# Patient Record
Sex: FEMALE | Race: WHITE | NOT HISPANIC OR LATINO | URBAN - METROPOLITAN AREA
[De-identification: names, ages, dates, MRNs, and addresses within clinical notes are randomized per-mention and may not be internally consistent; named-entity substitution may affect disease eponyms.]

---

## 2017-09-14 ENCOUNTER — OUTPATIENT (OUTPATIENT)
Dept: OUTPATIENT SERVICES | Facility: HOSPITAL | Age: 77
LOS: 1 days | Discharge: HOME | End: 2017-09-14

## 2017-09-14 DIAGNOSIS — E03.9 HYPOTHYROIDISM, UNSPECIFIED: ICD-10-CM

## 2017-09-14 DIAGNOSIS — E87.8 OTHER DISORDERS OF ELECTROLYTE AND FLUID BALANCE, NOT ELSEWHERE CLASSIFIED: ICD-10-CM

## 2017-09-14 DIAGNOSIS — I10 ESSENTIAL (PRIMARY) HYPERTENSION: ICD-10-CM

## 2017-09-14 DIAGNOSIS — E11.9 TYPE 2 DIABETES MELLITUS WITHOUT COMPLICATIONS: ICD-10-CM

## 2017-09-14 DIAGNOSIS — E78.5 HYPERLIPIDEMIA, UNSPECIFIED: ICD-10-CM

## 2017-09-14 DIAGNOSIS — K75.9 INFLAMMATORY LIVER DISEASE, UNSPECIFIED: ICD-10-CM

## 2017-09-14 DIAGNOSIS — D50.9 IRON DEFICIENCY ANEMIA, UNSPECIFIED: ICD-10-CM

## 2017-09-14 DIAGNOSIS — N39.0 URINARY TRACT INFECTION, SITE NOT SPECIFIED: ICD-10-CM

## 2017-09-14 DIAGNOSIS — E16.2 HYPOGLYCEMIA, UNSPECIFIED: ICD-10-CM

## 2017-09-14 DIAGNOSIS — E78.2 MIXED HYPERLIPIDEMIA: ICD-10-CM

## 2018-02-03 ENCOUNTER — INPATIENT (INPATIENT)
Facility: HOSPITAL | Age: 78
LOS: 6 days | Discharge: HOME HEALTH | End: 2018-02-10
Attending: INTERNAL MEDICINE

## 2018-02-03 VITALS
OXYGEN SATURATION: 98 % | RESPIRATION RATE: 18 BRPM | HEART RATE: 60 BPM | SYSTOLIC BLOOD PRESSURE: 129 MMHG | TEMPERATURE: 96 F | DIASTOLIC BLOOD PRESSURE: 65 MMHG

## 2018-02-03 DIAGNOSIS — E11.9 TYPE 2 DIABETES MELLITUS WITHOUT COMPLICATIONS: ICD-10-CM

## 2018-02-03 DIAGNOSIS — D50.0 IRON DEFICIENCY ANEMIA SECONDARY TO BLOOD LOSS (CHRONIC): ICD-10-CM

## 2018-02-03 DIAGNOSIS — E78.00 PURE HYPERCHOLESTEROLEMIA, UNSPECIFIED: ICD-10-CM

## 2018-02-03 DIAGNOSIS — R55 SYNCOPE AND COLLAPSE: ICD-10-CM

## 2018-02-03 DIAGNOSIS — I10 ESSENTIAL (PRIMARY) HYPERTENSION: ICD-10-CM

## 2018-02-03 DIAGNOSIS — Z98.890 OTHER SPECIFIED POSTPROCEDURAL STATES: Chronic | ICD-10-CM

## 2018-02-03 LAB
ALBUMIN SERPL ELPH-MCNC: 3.6 G/DL — SIGNIFICANT CHANGE UP (ref 3–5.5)
ALP SERPL-CCNC: 62 U/L — SIGNIFICANT CHANGE UP (ref 30–115)
ALT FLD-CCNC: 15 U/L — SIGNIFICANT CHANGE UP (ref 0–41)
ANION GAP SERPL CALC-SCNC: 11 MMOL/L — SIGNIFICANT CHANGE UP (ref 7–14)
APTT BLD: >120 SEC — SIGNIFICANT CHANGE UP (ref 27–39.2)
AST SERPL-CCNC: 21 U/L — SIGNIFICANT CHANGE UP (ref 0–41)
B-TYPE NATRIURETIC PEPTIDE BNP RESULT: 782 PG/ML — HIGH (ref 0–99)
BASOPHILS # BLD AUTO: 0.01 K/UL — SIGNIFICANT CHANGE UP (ref 0–0.2)
BASOPHILS NFR BLD AUTO: 0.1 % — SIGNIFICANT CHANGE UP (ref 0–1)
BILIRUB SERPL-MCNC: 0.8 MG/DL — SIGNIFICANT CHANGE UP (ref 0.2–1.2)
BUN SERPL-MCNC: 73 MG/DL — CRITICAL HIGH (ref 10–20)
CALCIUM SERPL-MCNC: 8.7 MG/DL — SIGNIFICANT CHANGE UP (ref 8.5–10.1)
CHLORIDE SERPL-SCNC: 104 MMOL/L — SIGNIFICANT CHANGE UP (ref 98–110)
CK MB BLD-MCNC: 4 % — SIGNIFICANT CHANGE UP (ref 0–4)
CK MB CFR SERPL CALC: 5.6 NG/ML — SIGNIFICANT CHANGE UP (ref 0.6–6.3)
CK SERPL-CCNC: 148 U/L — SIGNIFICANT CHANGE UP (ref 0–225)
CO2 SERPL-SCNC: 24 MMOL/L — SIGNIFICANT CHANGE UP (ref 17–32)
CREAT SERPL-MCNC: 1.7 MG/DL — HIGH (ref 0.7–1.5)
EOSINOPHIL # BLD AUTO: 0.11 K/UL — SIGNIFICANT CHANGE UP (ref 0–0.7)
EOSINOPHIL NFR BLD AUTO: 1.1 % — SIGNIFICANT CHANGE UP (ref 0–8)
GLUCOSE SERPL-MCNC: 114 MG/DL — HIGH (ref 70–110)
HCT VFR BLD CALC: 24.7 % — LOW (ref 37–47)
HGB BLD-MCNC: 8.1 G/DL — LOW (ref 14–18)
IMM GRANULOCYTES NFR BLD AUTO: 0.7 % — HIGH (ref 0.1–0.3)
INR BLD: 1.04 RATIO — SIGNIFICANT CHANGE UP (ref 0.65–1.3)
LACTATE SERPL-SCNC: 0.9 MMOL/L — SIGNIFICANT CHANGE UP (ref 0.5–2.2)
LIDOCAIN IGE QN: 29 U/L — SIGNIFICANT CHANGE UP (ref 7–60)
LYMPHOCYTES # BLD AUTO: 0.89 K/UL — LOW (ref 1.2–3.4)
LYMPHOCYTES # BLD AUTO: 8.5 % — LOW (ref 20.5–51.1)
MCHC RBC-ENTMCNC: 29.1 PG — SIGNIFICANT CHANGE UP (ref 27–31)
MCHC RBC-ENTMCNC: 32.8 G/DL — SIGNIFICANT CHANGE UP (ref 32–37)
MCV RBC AUTO: 88.8 FL — SIGNIFICANT CHANGE UP (ref 81–91)
MONOCYTES # BLD AUTO: 0.65 K/UL — HIGH (ref 0.1–0.6)
MONOCYTES NFR BLD AUTO: 6.2 % — SIGNIFICANT CHANGE UP (ref 1.7–9.3)
NEUTROPHILS # BLD AUTO: 8.72 K/UL — HIGH (ref 1.4–6.5)
NEUTROPHILS NFR BLD AUTO: 83.4 % — HIGH (ref 42.2–75.2)
PLATELET # BLD AUTO: 363 K/UL — SIGNIFICANT CHANGE UP (ref 130–400)
POTASSIUM SERPL-MCNC: 4.5 MMOL/L — SIGNIFICANT CHANGE UP (ref 3.5–5)
POTASSIUM SERPL-SCNC: 4.5 MMOL/L — SIGNIFICANT CHANGE UP (ref 3.5–5)
PROT SERPL-MCNC: 6.5 G/DL — SIGNIFICANT CHANGE UP (ref 6–8)
PROTHROM AB SERPL-ACNC: 11.2 SEC — SIGNIFICANT CHANGE UP (ref 9.95–12.87)
RBC # BLD: 2.78 M/UL — LOW (ref 4.2–5.4)
RBC # FLD: 14.6 % — HIGH (ref 11.5–14.5)
SODIUM SERPL-SCNC: 139 MMOL/L — SIGNIFICANT CHANGE UP (ref 135–146)
TROPONIN I SERPL-MCNC: 0.02 NG/ML — SIGNIFICANT CHANGE UP (ref 0–0.05)
WBC # BLD: 10.45 K/UL — SIGNIFICANT CHANGE UP (ref 4.8–10.8)
WBC # FLD AUTO: 10.45 K/UL — SIGNIFICANT CHANGE UP (ref 4.8–10.8)

## 2018-02-03 RX ORDER — SODIUM CHLORIDE 9 MG/ML
1000 INJECTION INTRAMUSCULAR; INTRAVENOUS; SUBCUTANEOUS ONCE
Qty: 0 | Refills: 0 | Status: COMPLETED | OUTPATIENT
Start: 2018-02-03 | End: 2018-02-03

## 2018-02-03 RX ORDER — INSULIN DETEMIR 100/ML (3)
25 INSULIN PEN (ML) SUBCUTANEOUS AT BEDTIME
Qty: 0 | Refills: 0 | Status: DISCONTINUED | OUTPATIENT
Start: 2018-02-03 | End: 2018-02-06

## 2018-02-03 RX ORDER — METFORMIN HYDROCHLORIDE 850 MG/1
500 TABLET ORAL
Qty: 0 | Refills: 0 | Status: DISCONTINUED | OUTPATIENT
Start: 2018-02-03 | End: 2018-02-06

## 2018-02-03 RX ORDER — TETANUS TOXOID, REDUCED DIPHTHERIA TOXOID AND ACELLULAR PERTUSSIS VACCINE, ADSORBED 5; 2.5; 8; 8; 2.5 [IU]/.5ML; [IU]/.5ML; UG/.5ML; UG/.5ML; UG/.5ML
0.5 SUSPENSION INTRAMUSCULAR ONCE
Qty: 0 | Refills: 0 | Status: COMPLETED | OUTPATIENT
Start: 2018-02-03 | End: 2018-02-03

## 2018-02-03 RX ORDER — ATENOLOL 25 MG/1
50 TABLET ORAL DAILY
Qty: 0 | Refills: 0 | Status: DISCONTINUED | OUTPATIENT
Start: 2018-02-03 | End: 2018-02-10

## 2018-02-03 RX ORDER — SERTRALINE 25 MG/1
100 TABLET, FILM COATED ORAL DAILY
Qty: 0 | Refills: 0 | Status: DISCONTINUED | OUTPATIENT
Start: 2018-02-03 | End: 2018-02-10

## 2018-02-03 RX ORDER — PANTOPRAZOLE SODIUM 20 MG/1
8 TABLET, DELAYED RELEASE ORAL
Qty: 80 | Refills: 0 | Status: DISCONTINUED | OUTPATIENT
Start: 2018-02-03 | End: 2018-02-09

## 2018-02-03 RX ORDER — ATORVASTATIN CALCIUM 80 MG/1
40 TABLET, FILM COATED ORAL AT BEDTIME
Qty: 0 | Refills: 0 | Status: DISCONTINUED | OUTPATIENT
Start: 2018-02-03 | End: 2018-02-10

## 2018-02-03 RX ORDER — LISINOPRIL 2.5 MG/1
20 TABLET ORAL DAILY
Qty: 0 | Refills: 0 | Status: DISCONTINUED | OUTPATIENT
Start: 2018-02-03 | End: 2018-02-10

## 2018-02-03 RX ADMIN — SODIUM CHLORIDE 1000 MILLILITER(S): 9 INJECTION INTRAMUSCULAR; INTRAVENOUS; SUBCUTANEOUS at 17:46

## 2018-02-03 RX ADMIN — PANTOPRAZOLE SODIUM 10 MG/HR: 20 TABLET, DELAYED RELEASE ORAL at 21:16

## 2018-02-03 RX ADMIN — TETANUS TOXOID, REDUCED DIPHTHERIA TOXOID AND ACELLULAR PERTUSSIS VACCINE, ADSORBED 0.5 MILLILITER(S): 5; 2.5; 8; 8; 2.5 SUSPENSION INTRAMUSCULAR at 19:58

## 2018-02-03 NOTE — ED PROVIDER NOTE - NS ED ROS FT
Pt denied fvr/chills, HA, visual changes, dizziness, light headedness, CP, UGALDE, PND, SOB, cough, hemoptysis, abd pain, n/v/d, changes in bowel or bladder habits, LE edema, numbness, weakness, changes in gait.  The pt also denied recent travel outside of the country, recent illnesses, sick contacts, recent airplane trips or periods of immobility/bedbound.

## 2018-02-03 NOTE — ED PROVIDER NOTE - CARE PLAN
Principal Discharge DX:	Syncope, unspecified syncope type  Secondary Diagnosis:	Diabetes  Secondary Diagnosis:	Systolic murmur Principal Discharge DX:	Anemia  Assessment and plan of treatment:	admitted to chest pain telemetry  Secondary Diagnosis:	Syncope, unspecified syncope type  Secondary Diagnosis:	Systolic murmur

## 2018-02-03 NOTE — ED PROVIDER NOTE - PROGRESS NOTE DETAILS
guiac positive, black stool Patient noted to have anemia of 8.1 with guaiac positive black stool, patient to be admitted. D/w ICU attending, not ICU, candidate at this time, will admit to tele. spoke with nirmal, deemed stable to floor tele, will see in the morning and reevaluate. Discussed with Dr. Stockton, patient to be admitted. Vital signs stable, will admit to low risk telemetry, given history of syncope

## 2018-02-03 NOTE — H&P ADULT - NSHPPHYSICALEXAM_GEN_ALL_CORE
HEENT: (+) bruises left upper and lower eye.  HEART: S1, S2  LUNGS: CTA b/l   ABD: (+) BS, soft, NT, ND  EXT: (+) DP/PT pulses, trace edema b/l LE  NEURO: AAOx3

## 2018-02-03 NOTE — ED PROVIDER NOTE - MEDICAL DECISION MAKING DETAILS
I personally evaluated the patient. I reviewed the Resident’s or Physician Assistant’s note (as assigned above), and agree with the findings and plan except as documented in my note.    79 yo F with history of DM, HTN, DLD presents to Ed with fall last night. Patient was going to bed  and then found down this morning by aide. Patient denies chest pain, lightheadedness or shortness of breath preceded event. Patient had bruising of L side of face. No headache. No chest or abdominal pain after fall PE: well-appearing, non toxic, + ecchymosis and abrasion over L forehead, PERRLA, EOMI, lungs CTA bilaterally, S1. S2. + systolic murmur, abdomen soft non-tender, no pelvic tenderness Plan: labs, ECG, imaging, will need to be observed with cardiology consult for syncope workup

## 2018-02-03 NOTE — ED PROVIDER NOTE - OBJECTIVE STATEMENT
pmh dm, htn, hld had unknown loc/sleeping episode last night was watching TV on floor, then this morning woke up on ground with noticed bruising on face, does not recall any events inbetween, pt has no complaints, has ambulated since that time without difficulty.

## 2018-02-03 NOTE — H&P ADULT - HISTORY OF PRESENT ILLNESS
77 y/o female with pmh of dm, htn, hld brought to the hospital for evaluation due to unknown loc/sleeping episode last night as watching TV but  this morning woke up on ground with noticed bruising on face, does not recall any events in-between pt has no complaints, has ambulated since that time without difficulty with walker according to son..

## 2018-02-04 LAB
ALBUMIN SERPL ELPH-MCNC: 3.3 G/DL — SIGNIFICANT CHANGE UP (ref 3–5.5)
ALP SERPL-CCNC: 57 U/L — SIGNIFICANT CHANGE UP (ref 30–115)
ALT FLD-CCNC: 12 U/L — SIGNIFICANT CHANGE UP (ref 0–41)
AMMONIA BLD-MCNC: 54 MMOL/L — CRITICAL HIGH (ref 11–35)
ANION GAP SERPL CALC-SCNC: 9 MMOL/L — SIGNIFICANT CHANGE UP (ref 7–14)
AST SERPL-CCNC: 17 U/L — SIGNIFICANT CHANGE UP (ref 0–41)
BILIRUB SERPL-MCNC: 0.9 MG/DL — SIGNIFICANT CHANGE UP (ref 0.2–1.2)
BUN SERPL-MCNC: 55 MG/DL — HIGH (ref 10–20)
CALCIUM SERPL-MCNC: 8.3 MG/DL — LOW (ref 8.5–10.1)
CHLORIDE SERPL-SCNC: 109 MMOL/L — SIGNIFICANT CHANGE UP (ref 98–110)
CO2 SERPL-SCNC: 24 MMOL/L — SIGNIFICANT CHANGE UP (ref 17–32)
CREAT SERPL-MCNC: 1.7 MG/DL — HIGH (ref 0.7–1.5)
GLUCOSE SERPL-MCNC: 214 MG/DL — HIGH (ref 70–110)
HCT VFR BLD CALC: 21.8 % — LOW (ref 37–47)
HGB BLD-MCNC: 7.3 G/DL — CRITICAL LOW (ref 14–18)
MCHC RBC-ENTMCNC: 29.7 PG — SIGNIFICANT CHANGE UP (ref 27–31)
MCHC RBC-ENTMCNC: 33.5 G/DL — SIGNIFICANT CHANGE UP (ref 32–37)
MCV RBC AUTO: 88.6 FL — SIGNIFICANT CHANGE UP (ref 81–91)
NRBC # BLD: 0 /100 WBCS — SIGNIFICANT CHANGE UP (ref 0–0)
PLATELET # BLD AUTO: 344 K/UL — SIGNIFICANT CHANGE UP (ref 130–400)
POTASSIUM SERPL-MCNC: 3.3 MMOL/L — LOW (ref 3.5–5)
POTASSIUM SERPL-SCNC: 3.3 MMOL/L — LOW (ref 3.5–5)
PROT SERPL-MCNC: 5.8 G/DL — LOW (ref 6–8)
RBC # BLD: 2.46 M/UL — LOW (ref 4.2–5.4)
RBC # FLD: 15.3 % — HIGH (ref 11.5–14.5)
SODIUM SERPL-SCNC: 142 MMOL/L — SIGNIFICANT CHANGE UP (ref 135–146)
WBC # BLD: 8.45 K/UL — SIGNIFICANT CHANGE UP (ref 4.8–10.8)
WBC # FLD AUTO: 8.45 K/UL — SIGNIFICANT CHANGE UP (ref 4.8–10.8)

## 2018-02-04 RX ORDER — ALPRAZOLAM 0.25 MG
0.25 TABLET ORAL ONCE
Qty: 0 | Refills: 0 | Status: DISCONTINUED | OUTPATIENT
Start: 2018-02-04 | End: 2018-02-04

## 2018-02-04 RX ADMIN — PANTOPRAZOLE SODIUM 10 MG/HR: 20 TABLET, DELAYED RELEASE ORAL at 06:10

## 2018-02-04 RX ADMIN — LISINOPRIL 20 MILLIGRAM(S): 2.5 TABLET ORAL at 05:34

## 2018-02-04 RX ADMIN — ATENOLOL 50 MILLIGRAM(S): 25 TABLET ORAL at 05:34

## 2018-02-04 RX ADMIN — SERTRALINE 100 MILLIGRAM(S): 25 TABLET, FILM COATED ORAL at 14:11

## 2018-02-04 RX ADMIN — ATORVASTATIN CALCIUM 40 MILLIGRAM(S): 80 TABLET, FILM COATED ORAL at 23:43

## 2018-02-04 RX ADMIN — Medication 0.25 MILLIGRAM(S): at 22:38

## 2018-02-04 NOTE — CONSULT NOTE ADULT - SUBJECTIVE AND OBJECTIVE BOX
History of Present Illness: 	   79 y/o female brought to the hospital for evaluation after fall, was noted to have episodes of GI bleeding	  PMH: HTN     Review of Systems:  Other Review of Systems: All other review of systems negative, except as noted in HPI	      Allergies and Intolerances:        Allergies:  	No Known Allergies:    .    Patient History:    Past Medical History:  Diabetes    Hypercholesteremia    Hypertension, unspecified type.     Past Surgical History:  H/O total hysterectomy.     Family History:  No pertinent family history in first degree relatives.     Social History:  Social History (marital status, living situation, occupation, tobacco use, alcohol and drug use, and sexual history): No tobacco use, lives with , no etoh use	       Heart Failure:  Does this patient have a history of or has been diagnosed with heart failure? no.       Physical Exam:  Physical Exam: HEENT: (+) bruises left upper and lower eye.  HEART: S1, S2  LUNGS: CTA b/l   ABD: (+) BS, soft, NT, ND  EXT: (+) DP/PT pulses, trace edema b/l LE NEURO: AAOx3	    Assessment and Plan:    Assessment:  · Assessment		  79 y/o y/o female admitted for syncope, and GI bleed.     Problem/Plan - 1:  ·  Problem: Syncope, unspecified syncope type.  Plan: Consider cardiology consult, serial CBC, Neuro check.      Problem/Plan - 2:  ·  Problem: Iron deficiency anemia due to chronic blood loss.  Plan: GI consult, F/U CBC, and transfuse PRN.      Problem/Plan - 3:  ·  Problem: Hypertension, unspecified type.  Plan: On lisinopril, HCTZ.      Problem/Plan - 4:  ·  Problem: Hypercholesteremia.  Plan: On lipitor.      Problem/Plan - 5:  ·  Problem: Type 2 diabetes mellitus without complication, unspecified long term insulin use status.  Plan: On levemir, metformin, finger sticks QAC/HS. History of Present Illness: 	   79 y/o female brought to the hospital for evaluation after fall, was noted to have progressive anemia with h/o dark stools.  Review of Systems: Pt denied fvr/chills, HA, visual changes, dizziness, light headedness, CP, UGALDE, PND, SOB, cough, hemoptysis, abd pain, n/v/d, changes in bowel or bladder habits, LE edema, numbness, weakness, changes in gait.  The pt also denied recent travel outside of the country, recent illnesses, sick contacts, recent airplane trips or periods of immobility/bedbound.	  PMH: HTN     Review of Systems:  Other Review of Systems: All other review of systems negative, except as noted in HPI	      Allergies and Intolerances:        Allergies:  	No Known Allergies:    .    Patient History:    Past Medical History:  Diabetes    Hypercholesteremia    Hypertension, unspecified type.     Past Surgical History:  H/O total hysterectomy.     Family History:  No pertinent family history in first degree relatives.     Social History:  Social History (marital status, living situation, occupation, tobacco use, alcohol and drug use, and sexual history): No tobacco use, lives with , no etoh use	       Heart Failure:  Does this patient have a history of or has been diagnosed with heart failure? no.       Physical Exam:  Physical Exam: HEENT: (+) bruises left upper and lower eye.  HEART: S1, S2  LUNGS: CTA b/l   ABD: (+) BS, soft, NT, ND  EXT: (+) DP/PT pulses, trace edema b/l LE NEURO: AAOx3	    Assessment and Plan:    Assessment:  · Assessment		  79 y/o y/o female admitted for syncope, and GI bleed.     Problem/Plan - 1:  ·  Problem: Syncope, unspecified syncope type.  Plan: Consider cardiology consult, serial CBC, Neuro check.      Problem/Plan - 2:  ·  Problem: Iron deficiency anemia due to chronic blood loss.  Plan: GI consult, F/U CBC, and transfuse PRN.      Problem/Plan - 3:  ·  Problem: Hypertension, unspecified type.  Plan: On lisinopril, HCTZ.      Problem/Plan - 4:  ·  Problem: Hypercholesteremia.  Plan: On lipitor.      Problem/Plan - 5:  ·  Problem: Type 2 diabetes mellitus without complication, unspecified long term insulin use status.  Plan: On levemir, metformin, finger sticks QAC/HS.

## 2018-02-04 NOTE — CONSULT NOTE ADULT - ASSESSMENT
Impression/Plan:    Progressive anemia  possibly secondary to PUD/gastritis    - PPI BID, continue IVF, keep NPO  - monitor H/H, keep Hgb >8   - discussed risk/benefit of endoscopic w/up based on clinical progression and cardio/pulm risk of anesthesia. will consider EGD if pt has signs of persistent bleeding (i.e. melena)

## 2018-02-05 DIAGNOSIS — K92.1 MELENA: ICD-10-CM

## 2018-02-05 LAB
APPEARANCE UR: (no result)
BILIRUB UR-MCNC: NEGATIVE — SIGNIFICANT CHANGE UP
COLOR SPEC: YELLOW — SIGNIFICANT CHANGE UP
DIFF PNL FLD: NEGATIVE — SIGNIFICANT CHANGE UP
GLUCOSE UR QL: NEGATIVE MG/DL — SIGNIFICANT CHANGE UP
HCT VFR BLD CALC: 22.5 % — LOW (ref 37–47)
HGB BLD-MCNC: 7.3 G/DL — CRITICAL LOW (ref 14–18)
KETONES UR-MCNC: NEGATIVE — SIGNIFICANT CHANGE UP
LEUKOCYTE ESTERASE UR-ACNC: (no result)
MCHC RBC-ENTMCNC: 29.4 PG — SIGNIFICANT CHANGE UP (ref 27–31)
MCHC RBC-ENTMCNC: 32.4 G/DL — SIGNIFICANT CHANGE UP (ref 32–37)
MCV RBC AUTO: 90.7 FL — SIGNIFICANT CHANGE UP (ref 81–91)
NITRITE UR-MCNC: NEGATIVE — SIGNIFICANT CHANGE UP
NRBC # BLD: 0 /100 WBCS — SIGNIFICANT CHANGE UP (ref 0–0)
PH UR: 6 — SIGNIFICANT CHANGE UP (ref 5–8)
PLATELET # BLD AUTO: 377 K/UL — SIGNIFICANT CHANGE UP (ref 130–400)
PROT UR-MCNC: NEGATIVE MG/DL — SIGNIFICANT CHANGE UP
RBC # BLD: 2.48 M/UL — LOW (ref 4.2–5.4)
RBC # FLD: 15.1 % — HIGH (ref 11.5–14.5)
SP GR SPEC: 1.02 — SIGNIFICANT CHANGE UP (ref 1.01–1.03)
TYPE + AB SCN PNL BLD: SIGNIFICANT CHANGE UP
UROBILINOGEN FLD QL: 0.2 MG/DL — SIGNIFICANT CHANGE UP (ref 0.2–0.2)
WBC # BLD: 9.78 K/UL — SIGNIFICANT CHANGE UP (ref 4.8–10.8)
WBC # FLD AUTO: 9.78 K/UL — SIGNIFICANT CHANGE UP (ref 4.8–10.8)

## 2018-02-05 RX ORDER — POTASSIUM CHLORIDE 20 MEQ
20 PACKET (EA) ORAL
Qty: 0 | Refills: 0 | Status: COMPLETED | OUTPATIENT
Start: 2018-02-05 | End: 2018-02-06

## 2018-02-05 RX ORDER — POTASSIUM CHLORIDE 20 MEQ
40 PACKET (EA) ORAL ONCE
Qty: 0 | Refills: 0 | Status: COMPLETED | OUTPATIENT
Start: 2018-02-05 | End: 2018-02-05

## 2018-02-05 RX ADMIN — ATENOLOL 50 MILLIGRAM(S): 25 TABLET ORAL at 06:26

## 2018-02-05 RX ADMIN — Medication 20 MILLIEQUIVALENT(S): at 21:28

## 2018-02-05 RX ADMIN — ATORVASTATIN CALCIUM 40 MILLIGRAM(S): 80 TABLET, FILM COATED ORAL at 21:29

## 2018-02-05 RX ADMIN — SERTRALINE 100 MILLIGRAM(S): 25 TABLET, FILM COATED ORAL at 14:31

## 2018-02-05 RX ADMIN — LISINOPRIL 20 MILLIGRAM(S): 2.5 TABLET ORAL at 06:26

## 2018-02-05 RX ADMIN — PANTOPRAZOLE SODIUM 10 MG/HR: 20 TABLET, DELAYED RELEASE ORAL at 09:22

## 2018-02-05 RX ADMIN — Medication 40 MILLIEQUIVALENT(S): at 17:43

## 2018-02-05 NOTE — DIETITIAN INITIAL EVALUATION ADULT. - OTHER INFO
pt presents with unknown LOC, pt does not recall any events leading up, admitted with syncope, anemia r/o GI bleed. As per GI noted progressive anemia h/o dark stools possibly 2/2 PUD/gastritis, pt presently tolerating po diet

## 2018-02-05 NOTE — DIETITIAN INITIAL EVALUATION ADULT. - DIET TYPE
consistent carbohydrate (evening snack)/renal replacement pts:no protein restr,no conc K & phos, low sodium

## 2018-02-06 DIAGNOSIS — I10 ESSENTIAL (PRIMARY) HYPERTENSION: ICD-10-CM

## 2018-02-06 DIAGNOSIS — E83.42 HYPOMAGNESEMIA: ICD-10-CM

## 2018-02-06 LAB
ABO RH CONFIRMATION: SIGNIFICANT CHANGE UP
ANION GAP SERPL CALC-SCNC: 8 MMOL/L — SIGNIFICANT CHANGE UP (ref 7–14)
BUN SERPL-MCNC: 36 MG/DL — HIGH (ref 10–20)
CALCIUM SERPL-MCNC: 8.3 MG/DL — LOW (ref 8.5–10.1)
CHLORIDE SERPL-SCNC: 109 MMOL/L — SIGNIFICANT CHANGE UP (ref 98–110)
CO2 SERPL-SCNC: 24 MMOL/L — SIGNIFICANT CHANGE UP (ref 17–32)
CREAT SERPL-MCNC: 1.5 MG/DL — SIGNIFICANT CHANGE UP (ref 0.7–1.5)
GLUCOSE SERPL-MCNC: 165 MG/DL — HIGH (ref 70–110)
HCT VFR BLD CALC: 27.7 % — LOW (ref 37–47)
HGB BLD-MCNC: 9.4 G/DL — LOW (ref 14–18)
MAGNESIUM SERPL-MCNC: 1.6 MG/DL — LOW (ref 1.8–2.4)
MCHC RBC-ENTMCNC: 29.3 PG — SIGNIFICANT CHANGE UP (ref 27–31)
MCHC RBC-ENTMCNC: 33.9 G/DL — SIGNIFICANT CHANGE UP (ref 32–37)
MCV RBC AUTO: 86.3 FL — SIGNIFICANT CHANGE UP (ref 81–91)
NRBC # BLD: 0 /100 WBCS — SIGNIFICANT CHANGE UP (ref 0–0)
PLATELET # BLD AUTO: 353 K/UL — SIGNIFICANT CHANGE UP (ref 130–400)
POTASSIUM SERPL-MCNC: 4 MMOL/L — SIGNIFICANT CHANGE UP (ref 3.5–5)
POTASSIUM SERPL-SCNC: 4 MMOL/L — SIGNIFICANT CHANGE UP (ref 3.5–5)
RBC # BLD: 3.21 M/UL — LOW (ref 4.2–5.4)
RBC # FLD: 15.4 % — HIGH (ref 11.5–14.5)
SODIUM SERPL-SCNC: 141 MMOL/L — SIGNIFICANT CHANGE UP (ref 135–146)
WBC # BLD: 8.57 K/UL — SIGNIFICANT CHANGE UP (ref 4.8–10.8)
WBC # FLD AUTO: 8.57 K/UL — SIGNIFICANT CHANGE UP (ref 4.8–10.8)

## 2018-02-06 RX ORDER — AMLODIPINE BESYLATE 2.5 MG/1
10 TABLET ORAL DAILY
Qty: 0 | Refills: 0 | Status: DISCONTINUED | OUTPATIENT
Start: 2018-02-06 | End: 2018-02-10

## 2018-02-06 RX ORDER — METFORMIN HYDROCHLORIDE 850 MG/1
500 TABLET ORAL EVERY 12 HOURS
Qty: 0 | Refills: 0 | Status: DISCONTINUED | OUTPATIENT
Start: 2018-02-06 | End: 2018-02-06

## 2018-02-06 RX ORDER — MAGNESIUM SULFATE 500 MG/ML
2 VIAL (ML) INJECTION ONCE
Qty: 0 | Refills: 0 | Status: COMPLETED | OUTPATIENT
Start: 2018-02-06 | End: 2018-02-06

## 2018-02-06 RX ORDER — HYDRALAZINE HCL 50 MG
10 TABLET ORAL
Qty: 0 | Refills: 0 | Status: DISCONTINUED | OUTPATIENT
Start: 2018-02-06 | End: 2018-02-10

## 2018-02-06 RX ORDER — INSULIN GLARGINE 100 [IU]/ML
25 INJECTION, SOLUTION SUBCUTANEOUS AT BEDTIME
Qty: 0 | Refills: 0 | Status: DISCONTINUED | OUTPATIENT
Start: 2018-02-06 | End: 2018-02-10

## 2018-02-06 RX ADMIN — Medication 10 MILLIGRAM(S): at 22:42

## 2018-02-06 RX ADMIN — AMLODIPINE BESYLATE 10 MILLIGRAM(S): 2.5 TABLET ORAL at 22:42

## 2018-02-06 RX ADMIN — PANTOPRAZOLE SODIUM 10 MG/HR: 20 TABLET, DELAYED RELEASE ORAL at 02:27

## 2018-02-06 RX ADMIN — ATORVASTATIN CALCIUM 40 MILLIGRAM(S): 80 TABLET, FILM COATED ORAL at 21:17

## 2018-02-06 RX ADMIN — PANTOPRAZOLE SODIUM 10 MG/HR: 20 TABLET, DELAYED RELEASE ORAL at 19:16

## 2018-02-06 RX ADMIN — LISINOPRIL 20 MILLIGRAM(S): 2.5 TABLET ORAL at 05:44

## 2018-02-06 RX ADMIN — ATENOLOL 50 MILLIGRAM(S): 25 TABLET ORAL at 05:43

## 2018-02-06 RX ADMIN — Medication 50 GRAM(S): at 21:21

## 2018-02-06 RX ADMIN — Medication 20 MILLIEQUIVALENT(S): at 05:43

## 2018-02-06 RX ADMIN — INSULIN GLARGINE 25 UNIT(S): 100 INJECTION, SOLUTION SUBCUTANEOUS at 21:20

## 2018-02-06 RX ADMIN — Medication 10 MILLIGRAM(S): at 18:54

## 2018-02-06 RX ADMIN — SERTRALINE 100 MILLIGRAM(S): 25 TABLET, FILM COATED ORAL at 12:45

## 2018-02-06 NOTE — CONSULT NOTE ADULT - SUBJECTIVE AND OBJECTIVE BOX
CARDIOLOGY CONSULT NOTE     CHIEF COMPLAINT/REASON FOR CONSULT:    HPI:  77 y/o female with pmh of dm, htn, hld brought to the hospital for evaluation due to unknown loc/sleeping episode last night as watching TV but  this morning woke up on ground with noticed bruising on face, does not recall any events in-between pt has no complaints, has ambulated since that time without difficulty with walker according to son.. (03 Feb 2018 21:52)      PAST MEDICAL & SURGICAL HISTORY:  Hypertension, unspecified type  Hypercholesteremia  Diabetes  H/O total hysterectomy      Cardiac Risks:   [x ]HTN, [x ] DM, [ ] Smoking, [ ] FH,  [x ] Lipids, [ ] Tob Use        MEDICATIONS:  MEDICATIONS  (STANDING):  ATENolol Oral Tab/Cap - Peds 50 milliGRAM(s) Oral daily  atorvastatin Oral Tab/Cap - Peds 40 milliGRAM(s) Oral at bedtime  insulin detemir injectable (LEVEMIR) 25 Unit(s) SubCutaneous at bedtime  lisinopril 20 milliGRAM(s) Oral daily  metFORMIN  milliGRAM(s) Oral two times a day with meals  pantoprazole Infusion 8 mG/Hr (10 mL/Hr) IV Continuous <Continuous>  sertraline 100 milliGRAM(s) Oral daily      FAMILY HISTORY:  No pertinent family history in first degree relatives      SOCIAL HISTORY:      [ ] Marital status   Allergies    No Known Allergies    Intolerances    	    REVIEW OF SYSTEMS:  CONSTITUTIONAL: No fever, weight loss, or fatigue  EYES: No eye pain, visual disturbances, or discharge  ENMT:  No difficulty hearing, tinnitus, vertigo; No sinus or throat pain  NECK: No pain or stiffness  RESPIRATORY: No cough, wheezing, chills or hemoptysis; No Shortness of Breath  CARDIOVASCULAR: No chest pain, palpitations, passing out, dizziness, or leg swelling  GASTROINTESTINAL: No abdominal or epigastric pain. No nausea, vomiting, or hematemesis; No diarrhea or constipation. No melena or hematochezia.  GENITOURINARY: No dysuria, frequency, hematuria, or incontinence  NEUROLOGICAL: No headaches, memory loss, loss of strength, numbness, or tremors  SKIN: No itching, burning, rashes, or lesions   	        PHYSICAL EXAM:  T(C): 36.4 (02-06-18 @ 07:02), Max: 36.5 (02-05-18 @ 15:03)  HR: 58 (02-06-18 @ 07:02) (57 - 62)  BP: 165/95 (02-06-18 @ 07:02) (148/90 - 196/81)  RR: 16 (02-06-18 @ 07:02) (12 - 20)      05 Feb 2018 07:01  -  06 Feb 2018 07:00  --------------------------------------------------------  IN: 570 mL / OUT: 1 mL / NET: 569 mL        Appearance: Normal	  Psychiatry: A & O x 3, Mood & affect appropriate  HEENT:   Normal oral mucosa, PERRL, EOMI	  Lymphatic: No lymphadenopathy  Cardiovascular: Normal S1 S2,RRR, No JVD, I/VI Khrai lsb  Respiratory: Lungs clear to auscultation	  Gastrointestinal:  Soft, Non-tender, + BS	  Skin: No rashes, No ecchymoses, No cyanosis	  Neurologic: Non-focal  Extremities: Normal range of motion, No clubbing, cyanosis or edema  Vascular: Peripheral pulses palpable 2+ bilaterally      	   	    EKG 2/5/18 SB NSST changes .Possible ant lat ischemi                                9.4    8.57  )-----------( 353      ( 06 Feb 2018 07:22 )             27.7     02-06    141  |  109  |  36<H>  ----------------------------<  165<H>  4.0   |  24  |  1.5    Ca    8.3<L>      06 Feb 2018 07:22  Mg     1.6     02-06    TPro  5.8<L>  /  Alb  3.3  /  TBili  0.9  /  DBili  x   /  AST  17  /  ALT  12  /  AlkPhos  57  02-04          IMP/PLAN: Patient fell. Sheis not clear if tripped. She is anemic . No chest pain No SOb. She claims she can walk several blocks. She needs a echo . She needs a echo. Need rule out MI. She needs ortho BP. Need follow CBC. She has no overt now contraindication to GI work up.

## 2018-02-06 NOTE — PROGRESS NOTE ADULT - PROBLEM SELECTOR PLAN 2
-cont meds  -add hydralazine  -monitor bp  -cardiology following
uncontrolled  patient on atenolol and lisinopril  will add amlodipine 10mg

## 2018-02-07 LAB
ANION GAP SERPL CALC-SCNC: 11 MMOL/L — SIGNIFICANT CHANGE UP (ref 7–14)
BUN SERPL-MCNC: 24 MG/DL — HIGH (ref 10–20)
CALCIUM SERPL-MCNC: 8.7 MG/DL — SIGNIFICANT CHANGE UP (ref 8.5–10.1)
CHLORIDE SERPL-SCNC: 103 MMOL/L — SIGNIFICANT CHANGE UP (ref 98–110)
CO2 SERPL-SCNC: 27 MMOL/L — SIGNIFICANT CHANGE UP (ref 17–32)
CREAT SERPL-MCNC: 1.5 MG/DL — SIGNIFICANT CHANGE UP (ref 0.7–1.5)
GLUCOSE SERPL-MCNC: 133 MG/DL — HIGH (ref 70–110)
HCT VFR BLD CALC: 30.6 % — LOW (ref 37–47)
HGB BLD-MCNC: 10 G/DL — LOW (ref 14–18)
MAGNESIUM SERPL-MCNC: 2.2 MG/DL — SIGNIFICANT CHANGE UP (ref 1.8–2.4)
MCHC RBC-ENTMCNC: 28.7 PG — SIGNIFICANT CHANGE UP (ref 27–31)
MCHC RBC-ENTMCNC: 32.7 G/DL — SIGNIFICANT CHANGE UP (ref 32–37)
MCV RBC AUTO: 87.7 FL — SIGNIFICANT CHANGE UP (ref 81–91)
NRBC # BLD: 0 /100 WBCS — SIGNIFICANT CHANGE UP (ref 0–0)
PLATELET # BLD AUTO: 368 K/UL — SIGNIFICANT CHANGE UP (ref 130–400)
POTASSIUM SERPL-MCNC: 3.9 MMOL/L — SIGNIFICANT CHANGE UP (ref 3.5–5)
POTASSIUM SERPL-SCNC: 3.9 MMOL/L — SIGNIFICANT CHANGE UP (ref 3.5–5)
RBC # BLD: 3.49 M/UL — LOW (ref 4.2–5.4)
RBC # FLD: 15.7 % — HIGH (ref 11.5–14.5)
SODIUM SERPL-SCNC: 141 MMOL/L — SIGNIFICANT CHANGE UP (ref 135–146)
WBC # BLD: 10.83 K/UL — HIGH (ref 4.8–10.8)
WBC # FLD AUTO: 10.83 K/UL — HIGH (ref 4.8–10.8)

## 2018-02-07 RX ADMIN — INSULIN GLARGINE 25 UNIT(S): 100 INJECTION, SOLUTION SUBCUTANEOUS at 21:20

## 2018-02-07 RX ADMIN — AMLODIPINE BESYLATE 10 MILLIGRAM(S): 2.5 TABLET ORAL at 07:44

## 2018-02-07 RX ADMIN — Medication 10 MILLIGRAM(S): at 05:42

## 2018-02-07 RX ADMIN — ATENOLOL 50 MILLIGRAM(S): 25 TABLET ORAL at 05:42

## 2018-02-07 RX ADMIN — ATORVASTATIN CALCIUM 10 MILLIGRAM(S): 80 TABLET, FILM COATED ORAL at 21:19

## 2018-02-07 RX ADMIN — Medication 10 MILLIGRAM(S): at 17:41

## 2018-02-07 RX ADMIN — SERTRALINE 100 MILLIGRAM(S): 25 TABLET, FILM COATED ORAL at 13:56

## 2018-02-07 RX ADMIN — PANTOPRAZOLE SODIUM 10 MG/HR: 20 TABLET, DELAYED RELEASE ORAL at 07:44

## 2018-02-07 RX ADMIN — PANTOPRAZOLE SODIUM 10 MG/HR: 20 TABLET, DELAYED RELEASE ORAL at 21:25

## 2018-02-07 RX ADMIN — LISINOPRIL 20 MILLIGRAM(S): 2.5 TABLET ORAL at 05:42

## 2018-02-07 RX ADMIN — Medication 10 MILLIGRAM(S): at 13:56

## 2018-02-07 RX ADMIN — Medication 10 MILLIGRAM(S): at 23:39

## 2018-02-08 RX ORDER — SERTRALINE 25 MG/1
0 TABLET, FILM COATED ORAL
Qty: 0 | Refills: 0 | COMMUNITY

## 2018-02-08 RX ORDER — AMLODIPINE BESYLATE 2.5 MG/1
1 TABLET ORAL
Qty: 30 | Refills: 0 | OUTPATIENT
Start: 2018-02-08

## 2018-02-08 RX ORDER — PANTOPRAZOLE SODIUM 20 MG/1
1 TABLET, DELAYED RELEASE ORAL
Qty: 30 | Refills: 0 | OUTPATIENT
Start: 2018-02-08

## 2018-02-08 RX ORDER — METFORMIN HYDROCHLORIDE 850 MG/1
0 TABLET ORAL
Qty: 0 | Refills: 0 | COMMUNITY

## 2018-02-08 RX ORDER — ATENOLOL 25 MG/1
0 TABLET ORAL
Qty: 0 | Refills: 0 | COMMUNITY

## 2018-02-08 RX ORDER — ATORVASTATIN CALCIUM 80 MG/1
0 TABLET, FILM COATED ORAL
Qty: 0 | Refills: 0 | COMMUNITY

## 2018-02-08 RX ORDER — MELOXICAM 15 MG/1
0 TABLET ORAL
Qty: 0 | Refills: 0 | COMMUNITY

## 2018-02-08 RX ADMIN — Medication 10 MILLIGRAM(S): at 23:31

## 2018-02-08 RX ADMIN — Medication 10 MILLIGRAM(S): at 05:36

## 2018-02-08 RX ADMIN — Medication 10 MILLIGRAM(S): at 11:02

## 2018-02-08 RX ADMIN — INSULIN GLARGINE 25 UNIT(S): 100 INJECTION, SOLUTION SUBCUTANEOUS at 21:34

## 2018-02-08 RX ADMIN — AMLODIPINE BESYLATE 10 MILLIGRAM(S): 2.5 TABLET ORAL at 05:36

## 2018-02-08 RX ADMIN — PANTOPRAZOLE SODIUM 10 MG/HR: 20 TABLET, DELAYED RELEASE ORAL at 22:16

## 2018-02-08 RX ADMIN — ATENOLOL 50 MILLIGRAM(S): 25 TABLET ORAL at 05:36

## 2018-02-08 RX ADMIN — SERTRALINE 100 MILLIGRAM(S): 25 TABLET, FILM COATED ORAL at 11:02

## 2018-02-08 RX ADMIN — ATORVASTATIN CALCIUM 40 MILLIGRAM(S): 80 TABLET, FILM COATED ORAL at 21:34

## 2018-02-08 RX ADMIN — Medication 10 MILLIGRAM(S): at 17:11

## 2018-02-08 RX ADMIN — LISINOPRIL 20 MILLIGRAM(S): 2.5 TABLET ORAL at 05:36

## 2018-02-08 NOTE — CHART NOTE - NSCHARTNOTEFT_GEN_A_CORE
GI:  Case reviewed with Dr Arora and Dr Gabriel.    The patient must be cleared by Medicine and Cardiology prior to Endoscopy with anaesthesia due to abnormal EKG  If there is no Cardiology clearance, the case will need to be postponed.    The patient is scheduled tentatively for 2/8/18: keep npo after midnight.    Naa JOSE

## 2018-02-08 NOTE — DISCHARGE NOTE ADULT - CARE PROVIDER_API CALL
Mahesh Jacome), Gastroenterology; Internal Medicine  [address] [place] [address]  Phone: [phone]  Fax: [phone]

## 2018-02-08 NOTE — DISCHARGE NOTE ADULT - PATIENT PORTAL LINK FT
You can access the [facility] Patient Portal, offered by [facility], by registering with the following website: [email or web address]

## 2018-02-08 NOTE — DISCHARGE NOTE ADULT - MEDICATION SUMMARY - MEDICATIONS TO TAKE
I will START or STAY ON the medications listed below when I get home from the hospital:    sertraline 100 mg oral tablet  -- 1 tab(s) by mouth once a day  -- Indication: For Depression    Levemir 100 units/mL subcutaneous solution  -- Indication: For Diabetes    atorvastatin 40 mg oral tablet  -- 1 tab(s) by mouth once a day  -- Indication: For Hypercholesteremia    lisinopril-hydroCHLOROthiazide 20 mg-25 mg oral tablet  -- Indication: For HTN (hypertension), malignant    atenolol 50 mg oral tablet  -- 1 tab(s) by mouth once a day  -- Indication: For HTN (hypertension), malignant    amLODIPine 10 mg oral tablet  -- 1 tab(s) by mouth once a day  -- Indication: For HTN (hypertension), malignant    pantoprazole 40 mg oral delayed release tablet  -- 1 tab(s) by mouth once a day   -- It is very important that you take or use this exactly as directed.  Do not skip doses or discontinue unless directed by your doctor.  Obtain medical advice before taking any non-prescription drugs as some may affect the action of this medication.  Swallow whole.  Do not crush.    -- Indication: For Gastrointestinal hemorrhage with melena I will START or STAY ON the medications listed below when I get home from the hospital:    sertraline 100 mg oral tablet  -- 1 tab(s) by mouth once a day  -- Indication: For Depression    Levemir 100 units/mL subcutaneous solution  -- Indication: For Diabetes    atorvastatin 40 mg oral tablet  -- 1 tab(s) by mouth once a day  -- Indication: For Hypercholesteremia    lisinopril-hydroCHLOROthiazide 20 mg-25 mg oral tablet  -- Indication: For HTN (hypertension), malignant    atenolol 50 mg oral tablet  -- 1 tab(s) by mouth once a day  -- Indication: For HTN (hypertension), malignant    amLODIPine 10 mg oral tablet  -- 1 tab(s) by mouth once a day  -- Indication: For HTN    Myrtle-Denys 325 mg (65 mg elemental iron) oral tablet  -- 1 tab(s) by mouth once a day   -- Check with your doctor before becoming pregnant.  Do not chew, break, or crush.  May discolor urine or feces.    -- Indication: For Iron deficiency anemia due to chronic blood loss    pantoprazole 40 mg oral delayed release tablet  -- 1 tab(s) by mouth once a day   -- It is very important that you take or use this exactly as directed.  Do not skip doses or discontinue unless directed by your doctor.  Obtain medical advice before taking any non-prescription drugs as some may affect the action of this medication.  Swallow whole.  Do not crush.    -- Indication: For GERD I will START or STAY ON the medications listed below when I get home from the hospital:    sertraline 100 mg oral tablet  -- 1 tab(s) by mouth once a day  -- Indication: For Depression    Levemir 100 units/mL subcutaneous solution  -- Indication: For Diabetes    atorvastatin 40 mg oral tablet  -- 1 tab(s) by mouth once a day  -- Indication: For Hypercholesteremia    lisinopril-hydroCHLOROthiazide 20 mg-25 mg oral tablet  -- Indication: For HTN (hypertension), malignant    atenolol 50 mg oral tablet  -- 1 tab(s) by mouth once a day  -- Indication: For HTN (hypertension), malignant I will START or STAY ON the medications listed below when I get home from the hospital:    sertraline 100 mg oral tablet  -- 1 tab(s) by mouth once a day  -- Indication: For Depression    Levemir 100 units/mL subcutaneous solution  -- Indication: For Diabetes    atorvastatin 40 mg oral tablet  -- 1 tab(s) by mouth once a day  -- Indication: For Hypercholesteremia    lisinopril-hydroCHLOROthiazide 20 mg-25 mg oral tablet  -- Indication: For HTN (hypertension), malignant    atenolol 50 mg oral tablet  -- 1 tab(s) by mouth once a day  -- Indication: For HTN (hypertension), malignant    amLODIPine 10 mg oral tablet  -- 1 tab(s) by mouth once a day  -- Indication: For HTN (hypertension), malignant    pantoprazole 40 mg oral delayed release tablet  -- 1 tab(s) by mouth once a day (before a meal)  -- Indication: For GERD

## 2018-02-08 NOTE — DISCHARGE NOTE ADULT - PLAN OF CARE
further recommendations by gastroenterology continue protonix  follow up with gastroenterology blood pressure control continue medications  follow up with primary care doctor

## 2018-02-09 LAB
ALLERGY+IMMUNOLOGY DIAG STUDY NOTE: SIGNIFICANT CHANGE UP
TYPE + AB SCN PNL BLD: SIGNIFICANT CHANGE UP

## 2018-02-09 RX ORDER — PANTOPRAZOLE SODIUM 20 MG/1
40 TABLET, DELAYED RELEASE ORAL
Qty: 0 | Refills: 0 | Status: DISCONTINUED | OUTPATIENT
Start: 2018-02-09 | End: 2018-02-10

## 2018-02-09 RX ADMIN — ATORVASTATIN CALCIUM 40 MILLIGRAM(S): 80 TABLET, FILM COATED ORAL at 21:32

## 2018-02-09 RX ADMIN — ATENOLOL 50 MILLIGRAM(S): 25 TABLET ORAL at 06:15

## 2018-02-09 RX ADMIN — AMLODIPINE BESYLATE 10 MILLIGRAM(S): 2.5 TABLET ORAL at 06:15

## 2018-02-09 RX ADMIN — PANTOPRAZOLE SODIUM 10 MG/HR: 20 TABLET, DELAYED RELEASE ORAL at 08:56

## 2018-02-09 RX ADMIN — Medication 10 MILLIGRAM(S): at 11:52

## 2018-02-09 RX ADMIN — Medication 10 MILLIGRAM(S): at 06:15

## 2018-02-09 RX ADMIN — SERTRALINE 100 MILLIGRAM(S): 25 TABLET, FILM COATED ORAL at 11:52

## 2018-02-09 RX ADMIN — LISINOPRIL 20 MILLIGRAM(S): 2.5 TABLET ORAL at 06:15

## 2018-02-09 RX ADMIN — Medication 10 MILLIGRAM(S): at 17:48

## 2018-02-09 NOTE — PRE-ANESTHESIA EVALUATION ADULT - NSANTHOSAYNRD_GEN_A_CORE
Pt denies JOSEFINA/No. JOSEFINA screening performed.  STOP BANG Legend: 0-2 = LOW Risk; 3-4 = INTERMEDIATE Risk; 5-8 = HIGH Risk

## 2018-02-09 NOTE — CHART NOTE - NSCHARTNOTEFT_GEN_A_CORE
Upper Endoscopy Report -Preliminary  (EGD)  Full Formal Note to follow    02-09-18 @ 16:57  ORESTES GARZA  INDICATION: GI Bleeding  Endoscopist: Naa  Endoscope: Olympus upper  ANESTHESIA: MAC   Complications: None  Procedure:  The indications, risks, benefits and alternatives were explained to the patient and informed consent was obtained . Tthe patient was placed in the left lateral decubitus position and sedation was administered by anaesthesia, using a pulse oxymeter and automatic blood pressure cuff to monitor vital signs. The endoscope was introduced to the mouth and the procedure was begun  Findings: Esophagitis,Gastritis, antral nodule: no source of bleeding  ESOPHAGUS: Several submucosal venous blebs noted in proximal esophagus. Mild distil esophagitis , biopsied  STOMACH: Diffuse gastritis, multiple biopsies taken. Antral nodule noted an biopsied.  DUODENUM: normal    Impression: Esophagitis, gastritis, antral nodule      Recommendations:Check pathology, consider PPI.  Colonoscopy advised: family has yet to decide if they wish to proceed as inpatient or outpatient,.    Mahesh Jacome MD FACG

## 2018-02-10 VITALS
SYSTOLIC BLOOD PRESSURE: 155 MMHG | RESPIRATION RATE: 16 BRPM | HEART RATE: 57 BPM | TEMPERATURE: 98 F | DIASTOLIC BLOOD PRESSURE: 68 MMHG

## 2018-02-10 RX ORDER — PANTOPRAZOLE SODIUM 20 MG/1
1 TABLET, DELAYED RELEASE ORAL
Qty: 30 | Refills: 0 | OUTPATIENT
Start: 2018-02-10

## 2018-02-10 RX ORDER — FERROUS SULFATE 325(65) MG
1 TABLET ORAL
Qty: 30 | Refills: 0 | OUTPATIENT
Start: 2018-02-10

## 2018-02-10 RX ORDER — SERTRALINE 25 MG/1
1 TABLET, FILM COATED ORAL
Qty: 0 | Refills: 0 | COMMUNITY

## 2018-02-10 RX ORDER — PANTOPRAZOLE SODIUM 20 MG/1
1 TABLET, DELAYED RELEASE ORAL
Qty: 0 | Refills: 0 | COMMUNITY
Start: 2018-02-10

## 2018-02-10 RX ORDER — ATORVASTATIN CALCIUM 80 MG/1
1 TABLET, FILM COATED ORAL
Qty: 0 | Refills: 0 | COMMUNITY

## 2018-02-10 RX ORDER — AMLODIPINE BESYLATE 2.5 MG/1
1 TABLET ORAL
Qty: 30 | Refills: 0 | OUTPATIENT
Start: 2018-02-10

## 2018-02-10 RX ORDER — INSULIN DETEMIR 100/ML (3)
0 INSULIN PEN (ML) SUBCUTANEOUS
Qty: 0 | Refills: 0 | COMMUNITY

## 2018-02-10 RX ORDER — AMLODIPINE BESYLATE 2.5 MG/1
1 TABLET ORAL
Qty: 0 | Refills: 0 | COMMUNITY
Start: 2018-02-10

## 2018-02-10 RX ORDER — ATENOLOL 25 MG/1
1 TABLET ORAL
Qty: 0 | Refills: 0 | COMMUNITY

## 2018-02-10 RX ADMIN — ATENOLOL 50 MILLIGRAM(S): 25 TABLET ORAL at 06:36

## 2018-02-10 RX ADMIN — PANTOPRAZOLE SODIUM 40 MILLIGRAM(S): 20 TABLET, DELAYED RELEASE ORAL at 06:38

## 2018-02-10 RX ADMIN — SERTRALINE 100 MILLIGRAM(S): 25 TABLET, FILM COATED ORAL at 11:32

## 2018-02-10 RX ADMIN — Medication 10 MILLIGRAM(S): at 06:36

## 2018-02-10 RX ADMIN — Medication 10 MILLIGRAM(S): at 11:32

## 2018-02-10 RX ADMIN — LISINOPRIL 20 MILLIGRAM(S): 2.5 TABLET ORAL at 06:35

## 2018-02-10 RX ADMIN — AMLODIPINE BESYLATE 10 MILLIGRAM(S): 2.5 TABLET ORAL at 06:36

## 2018-02-10 NOTE — PROGRESS NOTE ADULT - PROBLEM SELECTOR PLAN 1
EGD in am
Recommendations:The patient was advised she needs a colonoscopy, but has decided not to stay for the procedure.  She was again given our contact information, and advised to make an appointment for colonoscopy and possibly capsule endoscopy
#hypomagnesemia  -replete with mg rider  -repeat lab in am
s/p blood tranfusion   stable  wait gi follow up and plan
patient waiting for scope as per gi

## 2018-02-10 NOTE — PROGRESS NOTE ADULT - PROBLEM SELECTOR PROBLEM 1
Gastrointestinal hemorrhage with melena
Iron deficiency anemia due to chronic blood loss
Gastrointestinal hemorrhage with melena
Hypomagnesemia
Gastrointestinal hemorrhage with melena
Gastrointestinal hemorrhage with melena

## 2018-02-10 NOTE — PROGRESS NOTE ADULT - ASSESSMENT
Impression; 78y  year old Female with :anemia    Differential includes colonic or small bowel neoplasia, colitis, non GI issues    Recommendations:The patient was advised she needs a colonoscopy, but has decided not to stay for the procedure.  She was again given our contact information, and advised to make an appointment for colonoscopy and possibly capsule endoscopy
Patient to be scheduled for EGD in am. Case d/w Dr. Jacome
79 yo female with melena and anemia.  Rule out ulcer disease.  Patient will need to be optimized prior to endoscopy: she will need potassium corrected and she will need 2 units of PRBC, which should all be done today.  She will be added to the schedul for tomorrow, assuming she can be optimized, and her  agrees to the procedure.
Patient admitted with melena. Plan for c scope when medically cleared. Cardiology following. BP uncontrolled. started on norvasc today
See consult 2/6/18. Echo LVH EF 55%. No chest pain . No SOB. No cardiac contraindication to GI work up.
gi bleed and melena    await egd  d/w son

## 2018-02-10 NOTE — PROGRESS NOTE ADULT - PROVIDER SPECIALTY LIST ADULT
Cardiology
Gastroenterology
Hospitalist
Internal Medicine
Gastroenterology
Gastroenterology

## 2018-02-14 DIAGNOSIS — D50.0 IRON DEFICIENCY ANEMIA SECONDARY TO BLOOD LOSS (CHRONIC): ICD-10-CM

## 2018-02-14 DIAGNOSIS — E11.9 TYPE 2 DIABETES MELLITUS WITHOUT COMPLICATIONS: ICD-10-CM

## 2018-02-14 DIAGNOSIS — E83.42 HYPOMAGNESEMIA: ICD-10-CM

## 2018-02-14 DIAGNOSIS — G47.33 OBSTRUCTIVE SLEEP APNEA (ADULT) (PEDIATRIC): ICD-10-CM

## 2018-02-14 DIAGNOSIS — Z79.84 LONG TERM (CURRENT) USE OF ORAL HYPOGLYCEMIC DRUGS: ICD-10-CM
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
## 2018-02-14 DIAGNOSIS — Z91.81 HISTORY OF FALLING: ICD-10-CM

## 2018-02-14 DIAGNOSIS — Z90.710 ACQUIRED ABSENCE OF BOTH CERVIX AND UTERUS: ICD-10-CM

## 2018-02-14 DIAGNOSIS — K92.1 MELENA: ICD-10-CM

## 2018-02-14 DIAGNOSIS — K31.9 DISEASE OF STOMACH AND DUODENUM, UNSPECIFIED: ICD-10-CM

## 2018-02-14 DIAGNOSIS — K44.9 DIAPHRAGMATIC HERNIA WITHOUT OBSTRUCTION OR GANGRENE: ICD-10-CM

## 2018-02-14 DIAGNOSIS — F32.9 MAJOR DEPRESSIVE DISORDER, SINGLE EPISODE, UNSPECIFIED: ICD-10-CM

## 2018-02-14 DIAGNOSIS — K29.70 GASTRITIS, UNSPECIFIED, WITHOUT BLEEDING: ICD-10-CM

## 2018-02-14 DIAGNOSIS — K21.0 GASTRO-ESOPHAGEAL REFLUX DISEASE WITH ESOPHAGITIS: ICD-10-CM

## 2018-02-14 DIAGNOSIS — E78.00 PURE HYPERCHOLESTEROLEMIA, UNSPECIFIED: ICD-10-CM

## 2018-02-14 DIAGNOSIS — S00.83XA CONTUSION OF OTHER PART OF HEAD, INITIAL ENCOUNTER: ICD-10-CM
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
## 2018-02-14 DIAGNOSIS — R55 SYNCOPE AND COLLAPSE: ICD-10-CM

## 2018-02-14 DIAGNOSIS — D64.9 ANEMIA, UNSPECIFIED: ICD-10-CM

## 2018-02-15 LAB — SURGICAL PATHOLOGY STUDY: SIGNIFICANT CHANGE UP

## 2018-02-17 DIAGNOSIS — K31.89 OTHER DISEASES OF STOMACH AND DUODENUM: ICD-10-CM

## 2018-02-17 DIAGNOSIS — K29.50 UNSPECIFIED CHRONIC GASTRITIS WITHOUT BLEEDING: ICD-10-CM

## 2018-03-19 ENCOUNTER — OUTPATIENT (OUTPATIENT)
Dept: OUTPATIENT SERVICES | Facility: HOSPITAL | Age: 78
LOS: 1 days | Discharge: HOME | End: 2018-03-19

## 2018-03-19 DIAGNOSIS — Z98.890 OTHER SPECIFIED POSTPROCEDURAL STATES: Chronic | ICD-10-CM

## 2018-03-19 DIAGNOSIS — D50.9 IRON DEFICIENCY ANEMIA, UNSPECIFIED: ICD-10-CM

## 2018-03-19 PROBLEM — I10 ESSENTIAL (PRIMARY) HYPERTENSION: Chronic | Status: ACTIVE | Noted: 2018-02-03

## 2018-03-19 PROBLEM — E78.00 PURE HYPERCHOLESTEROLEMIA, UNSPECIFIED: Chronic | Status: ACTIVE | Noted: 2018-02-03

## 2018-03-19 PROBLEM — E11.9 TYPE 2 DIABETES MELLITUS WITHOUT COMPLICATIONS: Chronic | Status: ACTIVE | Noted: 2018-02-03

## 2018-07-17 NOTE — PROGRESS NOTE ADULT - SUBJECTIVE AND OBJECTIVE BOX
GI Followup Note:   Pt seen and examined at bedside.   78y year old  Female seen  by GI  for : anemia and history of dark stool        Subjective:Pt feels well, no rectal bleeding, no melena.  See EGD report from yesterday      REVIEW OF SYSTEMS:  Constitutional: No fever, weight loss or fatigue  Cardiovascular: No chest pain, palpitations, dizziness or leg swelling  Gastrointestinal: No abdominal or epigastric pain. No nausea, vomiting or hematemesis; No diarrhea or constipation. No melena or hematochezia.  Skin: No itching, burning, rashes or lesions     Allergies;    No Known Allergies    Intolerances        Medications; MEDICATIONS  (STANDING):  amLODIPine   Tablet 10 milliGRAM(s) Oral daily  ATENolol Oral Tab/Cap - Peds 50 milliGRAM(s) Oral daily  atorvastatin Oral Tab/Cap - Peds 40 milliGRAM(s) Oral at bedtime  hydrALAZINE 10 milliGRAM(s) Oral four times a day  insulin glargine Injectable (LANTUS) 25 Unit(s) SubCutaneous at bedtime  lisinopril 20 milliGRAM(s) Oral daily  pantoprazole    Tablet 40 milliGRAM(s) Oral before breakfast  sertraline 100 milliGRAM(s) Oral daily    MEDICATIONS  (PRN):      PHYSICAL EXAM:    Vital Signs Last 24 Hrs  T(C): 36.8 (10 Feb 2018 07:18), Max: 36.8 (10 Feb 2018 07:18)  T(F): 98.2 (10 Feb 2018 07:18), Max: 98.2 (10 Feb 2018 07:18)  HR: 57 (10 Feb 2018 07:18) (57 - 74)  BP: 155/68 (10 Feb 2018 07:18) (107/54 - 185/77)  BP(mean): --  RR: 16 (10 Feb 2018 07:18) (14 - 18)  SpO2: 95% (09 Feb 2018 16:46) (95% - 98%)    02-10 @ 07:01  -  02-10 @ 13:24  --------------------------------------------------------  IN: 0 mL / OUT: 2 mL / NET: -2 mL        General: Well developed; well nourished; in no acute distress  HEENT: , conjunctiva and sclera clear  Lungs: Clear, no Rhonchi  Gastrointestinal: Soft non-tender non-distended; Normal bowel sounds; No hepatosplenomegaly. No rebound or guarding  Skin: Warm and dry. No obvious rash    LABS: no recent labs
GI Followup Note:   Pt seen and examined at bedside.  78y Female   seen  by GI  for :Anemia.  Patient has had an ECHO EF normal. R/o by enzymes.        Subjective:      REVIEW OF SYSTEMS:  Constitutional: No fever, weight loss or fatigue  Cardiovascular: No chest pain, palpitations, dizziness or leg swelling  Gastrointestinal: No abdominal or epigastric pain. No nausea, vomiting or hematemesis; No diarrhea or constipation. No melena or hematochezia.  Skin: No itching, burning, rashes or lesions     Allergies    No Known Allergies    Intolerances        MEDICATIONS  (STANDING):  amLODIPine   Tablet 10 milliGRAM(s) Oral daily  ATENolol Oral Tab/Cap - Peds 50 milliGRAM(s) Oral daily  atorvastatin Oral Tab/Cap - Peds 40 milliGRAM(s) Oral at bedtime  hydrALAZINE 10 milliGRAM(s) Oral four times a day  insulin glargine Injectable (LANTUS) 25 Unit(s) SubCutaneous at bedtime  lisinopril 20 milliGRAM(s) Oral daily  pantoprazole Infusion 8 mG/Hr (10 mL/Hr) IV Continuous <Continuous>  sertraline 100 milliGRAM(s) Oral daily    MEDICATIONS  (PRN):      Vital Signs Last 24 Hrs  T(C): 36.2 (08 Feb 2018 15:00), Max: 36.4 (07 Feb 2018 23:34)  T(F): 97.1 (08 Feb 2018 15:00), Max: 97.6 (07 Feb 2018 23:34)  HR: 56 (08 Feb 2018 15:00) (54 - 65)  BP: 115/59 (08 Feb 2018 15:00) (115/59 - 152/69)  BP(mean): --  RR: 16 (08 Feb 2018 15:00) (14 - 16)  SpO2: --    02-07 @ 07:01  -  02-08 @ 07:00  --------------------------------------------------------  IN: 100 mL / OUT: 2 mL / NET: 98 mL    02-08 @ 07:01  -  02-08 @ 17:09  --------------------------------------------------------  IN: 270 mL / OUT: 0 mL / NET: 270 mL        PHYSICAL EXAM:    General: Well developed; well nourished; in no acute distress  HEENT: MMM, conjunctiva and sclera clear  Lungs: Clear, no Rhonchi  Gastrointestinal: Soft non-tender non-distended; Normal bowel sounds; No hepatosplenomegaly. No rebound or guarding  Skin: Warm and dry. No obvious rash    LABS:  CBC Full  -  ( 07 Feb 2018 07:30 )  WBC Count : 10.83 K/uL  Hemoglobin : 10.0 g/dL  Hematocrit : 30.6 %  Platelet Count - Automated : 368 K/uL  Mean Cell Volume : 87.7 fL  Mean Cell Hemoglobin : 28.7 pg  Mean Cell Hemoglobin Concentration : 32.7 g/dL  Auto Neutrophil # : x  Auto Lymphocyte # : x  Auto Monocyte # : x  Auto Eosinophil # : x  Auto Basophil # : x  Auto Neutrophil % : x  Auto Lymphocyte % : x  Auto Monocyte % : x  Auto Eosinophil % : x  Auto Basophil % : x    02-07    141  |  103  |  24<H>  ----------------------------<  133<H>  3.9   |  27  |  1.5    Ca    8.7      07 Feb 2018 07:30  Mg     2.2     02-07                        RADIOLOGY & ADDITIONAL STUDIES:
GI Followup Note:   Pt seen and examined at bedside.  78y Female   seen  by GI  for  melena and anemia.     Subjective:The patient is confused, and unable to give a reliable history. She denies nausea , vomiting or previous procedures          REVIEW OF SYSTEMS:  Constitutional: No fever, weight loss or fatigue  Cardiovascular: No chest pain, palpitations, dizziness or leg swelling  Gastrointestinal: No abdominal or epigastric pain. No nausea, vomiting or hematemesis; No diarrhea or constipation. No melena or hematochezia.  Skin: No itching, burning, rashes or lesions     Allergies    No Known Allergies            MEDICATIONS  (STANDING):  ATENolol Oral Tab/Cap - Peds 50 milliGRAM(s) Oral daily  atorvastatin Oral Tab/Cap - Peds 40 milliGRAM(s) Oral at bedtime  insulin detemir injectable (LEVEMIR) 25 Unit(s) SubCutaneous at bedtime  lisinopril 20 milliGRAM(s) Oral daily  metFORMIN  milliGRAM(s) Oral two times a day with meals  pantoprazole Infusion 8 mG/Hr (10 mL/Hr) IV Continuous <Continuous>  sertraline 100 milliGRAM(s) Oral daily    MEDICATIONS  (PRN):      Vital Signs Last 24 Hrs  T(C): 36.4 (05 Feb 2018 07:37), Max: 37.1 (04 Feb 2018 16:36)  T(F): 97.6 (05 Feb 2018 07:37), Max: 98.8 (04 Feb 2018 16:36)  HR: 60 (05 Feb 2018 07:37) (60 - 68)  BP: 165/73 (05 Feb 2018 07:37) (136/63 - 179/74)  BP(mean): --  RR: 16 (05 Feb 2018 07:37) (16 - 16)  SpO2: 97% (04 Feb 2018 16:36) (97% - 97%)    02-04 @ 07:01  -  02-05 @ 07:00  --------------------------------------------------------  IN: 110 mL / OUT: 0 mL / NET: 110 mL    02-05 @ 07:01  -  02-05 @ 14:03  --------------------------------------------------------  IN: 100 mL / OUT: 0 mL / NET: 100 mL        PHYSICAL EXAM:    General: Well developed; well nourished; in no acute distress  HEENT: MMM, conjunctiva and sclera clear  Lungs: Clear, no Rhonchi  Gastrointestinal: Soft non-tender non-distended; Normal bowel sounds; No hepatosplenomegaly. No rebound or guarding  Skin: Warm and dry. No obvious rash    LABS:  CBC Full  -  ( 04 Feb 2018 19:19 )  WBC Count : 8.45 K/uL  Hemoglobin : 7.3 g/dL  Hematocrit : 21.8 %  Platelet Count - Automated : 344 K/uL  Mean Cell Volume : 88.6 fL  Mean Cell Hemoglobin : 29.7 pg  Mean Cell Hemoglobin Concentration : 33.5 g/dL  Auto Neutrophil # : x  Auto Lymphocyte # : x  Auto Monocyte # : x  Auto Eosinophil # : x  Auto Basophil # : x  Auto Neutrophil % : x  Auto Lymphocyte % : x  Auto Monocyte % : x  Auto Eosinophil % : x  Auto Basophil % : x    02-04    142  |  109  |  55<H>  ----------------------------<  214<H>  3.3<L>   |  24  |  1.7<H>    Ca    8.3<L>      04 Feb 2018 19:19    TPro  5.8<L>  /  Alb  3.3  /  TBili  0.9  /  DBili  x   /  AST  17  /  ALT  12  /  AlkPhos  57  02-04    PT/INR - ( 03 Feb 2018 18:26 )   PT: 11.20 sec;   INR: 1.04 ratio         PTT - ( 03 Feb 2018 18:26 )  PTT:>120 sec
Patient is a 78y old  Female who presents with a chief complaint of 79 y/o female c/o fall . No complaints now      T(F): 97 (02-09-18 @ 07:20), Max: 97.1 (02-08-18 @ 15:00)  HR: 56 (02-09-18 @ 07:20)  BP: 155/68 (02-09-18 @ 07:20)  RR: 16 (02-09-18 @ 07:20)      PHYSICAL EXAM:  GENERAL: NAD, well-groomed, well-developed  HEAD:  Atraumatic, Normocephalic  EYES: EOMI, PERRLA, conjunctiva and sclera clear  ENMT: No tonsillar erythema, exudates, or enlargement; Moist mucous membranes, Good dentition, No lesions  NECK: Supple, No JVD, Normal thyroid  NERVOUS SYSTEM:  Alert & Oriented X3,  Motor Strength 5/5 B/L upper and lower extremities  CHEST/LUNG: Clear to percussion bilaterally; No rales, rhonchi, wheezing, or rubs  HEART: Regular rate and rhythm; No murmurs, rubs, or gallops  ABDOMEN: Soft, Nontender, Nondistended; Bowel sounds present  EXTREMITIES:   No clubbing, cyanosis, or edema  LYMPH: No lymphadenopathy noted  SKIN: No rashes or lesions                              amLODIPine   Tablet 10 milliGRAM(s) Oral daily  ATENolol Oral Tab/Cap - Peds 50 milliGRAM(s) Oral daily  atorvastatin Oral Tab/Cap - Peds 40 milliGRAM(s) Oral at bedtime  hydrALAZINE 10 milliGRAM(s) Oral four times a day  insulin glargine Injectable (LANTUS) 25 Unit(s) SubCutaneous at bedtime  lisinopril 20 milliGRAM(s) Oral daily  pantoprazole Infusion 8 mG/Hr IV Continuous <Continuous>  sertraline 100 milliGRAM(s) Oral daily
Patient resting comfortable in bed. No new c/o. No cp or sob.  echo--nl EF  02-06    141  |  109  |  36<H>  ----------------------------<  165<H>  4.0   |  24  |  1.5    Ca    8.3<L>      06 Feb 2018 07:22  Mg     1.6     02-06    TPro  5.8<L>  /  Alb  3.3  /  TBili  0.9  /  DBili  x   /  AST  17  /  ALT  12  /  AlkPhos  57  02-04                        9.4    8.57  )-----------( 353      ( 06 Feb 2018 07:22 )             27.7
Progress Note:   · Provider Specialty	Hospitalist      · Subjective and Objective:   Patient resting comfortable in bed. No new c/o. No cp or sob.  echo--nl EF  02-06    141  |  109  |  36<H>  ----------------------------<  165<H>  4.0   |  24  |  1.5    Ca    8.3<L>      06 Feb 2018 07:22  Mg     1.6     02-06    TPro  5.8<L>  /  Alb  3.3  /  TBili  0.9  /  DBili  x   /  AST  17  /  ALT  12  /  AlkPhos  57  02-04                        9.4    8.57  )-----------( 353      ( 06 Feb 2018 07:22 )             27.7       -   ass/plan====  gibleed    await gi follow up and await EGD
patient seen and examined    Vital Signs Last 24 Hrs  T(C): 36.4 (06 Feb 2018 07:02), Max: 36.5 (05 Feb 2018 15:03)  T(F): 97.6 (06 Feb 2018 07:02), Max: 97.7 (05 Feb 2018 15:03)  HR: 59 (06 Feb 2018 13:27) (57 - 62)  BP: 219/94 (06 Feb 2018 13:27) (148/90 - 219/94)  BP(mean): --  RR: 16 (06 Feb 2018 07:02) (12 - 20)  SpO2: --    MEDICATIONS  (STANDING):  ATENolol Oral Tab/Cap - Peds 50 milliGRAM(s) Oral daily  atorvastatin Oral Tab/Cap - Peds 40 milliGRAM(s) Oral at bedtime  insulin glargine Injectable (LANTUS) 25 Unit(s) SubCutaneous at bedtime  lisinopril 20 milliGRAM(s) Oral daily  pantoprazole Infusion 8 mG/Hr (10 mL/Hr) IV Continuous <Continuous>  sertraline 100 milliGRAM(s) Oral daily  CBC Full  -  ( 06 Feb 2018 07:22 )  WBC Count : 8.57 K/uL  Hemoglobin : 9.4 g/dL  Hematocrit : 27.7 %  Platelet Count - Automated : 353 K/uL  Mean Cell Volume : 86.3 fL  Mean Cell Hemoglobin : 29.3 pg  Mean Cell Hemoglobin Concentration : 33.9 g/dL  Auto Neutrophil # : x  Auto Lymphocyte # : x  Auto Monocyte # : x  Auto Eosinophil # : x  Auto Basophil # : x  Auto Neutrophil % : x  Auto Lymphocyte % : x  Auto Monocyte % : x  Auto Eosinophil % : x  Auto Basophil % : x
patient seen and examined  stable  Vital Signs Last 24 Hrs  T(C): 36.8 (07 Feb 2018 07:53), Max: 36.8 (07 Feb 2018 07:53)  T(F): 98.2 (07 Feb 2018 07:53), Max: 98.2 (07 Feb 2018 07:53)  HR: 52 (07 Feb 2018 07:53) (52 - 57)  BP: 164/73 (07 Feb 2018 07:53) (107/60 - 198/84)  BP(mean): --  RR: 16 (07 Feb 2018 07:53) (14 - 16)  SpO2: --    MEDICATIONS  (STANDING):  amLODIPine   Tablet 10 milliGRAM(s) Oral daily  ATENolol Oral Tab/Cap - Peds 50 milliGRAM(s) Oral daily  atorvastatin Oral Tab/Cap - Peds 40 milliGRAM(s) Oral at bedtime  hydrALAZINE 10 milliGRAM(s) Oral four times a day  insulin glargine Injectable (LANTUS) 25 Unit(s) SubCutaneous at bedtime  lisinopril 20 milliGRAM(s) Oral daily  pantoprazole Infusion 8 mG/Hr (10 mL/Hr) IV Continuous <Continuous>  sertraline 100 milliGRAM(s) Oral daily    MEDICATIONS  (PRN):
patient waiting for egd  stable
13.2

## 2018-09-19 ENCOUNTER — OUTPATIENT (OUTPATIENT)
Dept: OUTPATIENT SERVICES | Facility: HOSPITAL | Age: 78
LOS: 1 days | Discharge: HOME | End: 2018-09-19

## 2018-09-19 DIAGNOSIS — Z98.890 OTHER SPECIFIED POSTPROCEDURAL STATES: Chronic | ICD-10-CM

## 2018-10-03 DIAGNOSIS — E78.2 MIXED HYPERLIPIDEMIA: ICD-10-CM

## 2018-10-03 DIAGNOSIS — K75.9 INFLAMMATORY LIVER DISEASE, UNSPECIFIED: ICD-10-CM

## 2018-10-03 DIAGNOSIS — D50.9 IRON DEFICIENCY ANEMIA, UNSPECIFIED: ICD-10-CM

## 2019-01-02 ENCOUNTER — EMERGENCY (EMERGENCY)
Facility: HOSPITAL | Age: 79
LOS: 0 days | Discharge: HOME | End: 2019-01-02
Attending: EMERGENCY MEDICINE | Admitting: EMERGENCY MEDICINE

## 2019-01-02 VITALS
SYSTOLIC BLOOD PRESSURE: 198 MMHG | HEART RATE: 60 BPM | RESPIRATION RATE: 18 BRPM | DIASTOLIC BLOOD PRESSURE: 99 MMHG | WEIGHT: 125 LBS | TEMPERATURE: 97 F | OXYGEN SATURATION: 98 %

## 2019-01-02 VITALS
RESPIRATION RATE: 18 BRPM | DIASTOLIC BLOOD PRESSURE: 80 MMHG | SYSTOLIC BLOOD PRESSURE: 185 MMHG | OXYGEN SATURATION: 98 % | HEART RATE: 58 BPM | TEMPERATURE: 96 F

## 2019-01-02 DIAGNOSIS — S09.90XA UNSPECIFIED INJURY OF HEAD, INITIAL ENCOUNTER: ICD-10-CM

## 2019-01-02 DIAGNOSIS — Z98.890 OTHER SPECIFIED POSTPROCEDURAL STATES: Chronic | ICD-10-CM

## 2019-01-02 DIAGNOSIS — E78.00 PURE HYPERCHOLESTEROLEMIA, UNSPECIFIED: ICD-10-CM

## 2019-01-02 DIAGNOSIS — I10 ESSENTIAL (PRIMARY) HYPERTENSION: ICD-10-CM
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
## 2019-01-02 DIAGNOSIS — Z79.4 LONG TERM (CURRENT) USE OF INSULIN: ICD-10-CM

## 2019-01-02 DIAGNOSIS — E11.9 TYPE 2 DIABETES MELLITUS WITHOUT COMPLICATIONS: ICD-10-CM

## 2019-01-02 DIAGNOSIS — Z90.710 ACQUIRED ABSENCE OF BOTH CERVIX AND UTERUS: ICD-10-CM

## 2019-01-02 DIAGNOSIS — S01.01XA LACERATION WITHOUT FOREIGN BODY OF SCALP, INITIAL ENCOUNTER: ICD-10-CM
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
## 2019-01-02 DIAGNOSIS — Z23 ENCOUNTER FOR IMMUNIZATION: ICD-10-CM
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
## 2019-01-02 RX ORDER — ACETAMINOPHEN 500 MG
650 TABLET ORAL ONCE
Qty: 0 | Refills: 0 | Status: COMPLETED | OUTPATIENT
Start: 2019-01-02 | End: 2019-01-02

## 2019-01-02 RX ORDER — TETANUS TOXOID, REDUCED DIPHTHERIA TOXOID AND ACELLULAR PERTUSSIS VACCINE, ADSORBED 5; 2.5; 8; 8; 2.5 [IU]/.5ML; [IU]/.5ML; UG/.5ML; UG/.5ML; UG/.5ML
0.5 SUSPENSION INTRAMUSCULAR ONCE
Qty: 0 | Refills: 0 | Status: COMPLETED | OUTPATIENT
Start: 2019-01-02 | End: 2019-01-02

## 2019-01-02 RX ADMIN — Medication 650 MILLIGRAM(S): at 17:32

## 2019-01-02 RX ADMIN — TETANUS TOXOID, REDUCED DIPHTHERIA TOXOID AND ACELLULAR PERTUSSIS VACCINE, ADSORBED 0.5 MILLILITER(S): 5; 2.5; 8; 8; 2.5 SUSPENSION INTRAMUSCULAR at 17:31

## 2019-01-02 NOTE — ED PROVIDER NOTE - NS ED ROS FT
Review of Systems    Constitutional: (-) fever  Eyes/ENT: (-) blurry vision, (-) epistaxis  Gastrointestinal: (-) vomiting, (-) diarrhea  Musculoskeletal: (-) neck pain, (-) back pain, (-) joint pain  Integumentary: (-) rash, (-) edema, (+) bleeding to scalp  Neurological: (-) headache, (-) altered mental status

## 2019-01-02 NOTE — ED PROVIDER NOTE - CARE PROVIDER_API CALL
Sai Trujillo (MD), Medicine  [address] [place] [address]  Phone: [phone]  Fax: [phone]

## 2019-01-02 NOTE — ED PROVIDER NOTE - PHYSICAL EXAMINATION
Gen: Alert, NAD, well appearing  Head: NC. +hematoma with 0.7 cm laceration  to right parietal area of scalp.  PERRL, EOMI, normal lids/conjunctiva  ENT: normal hearing, patent oropharynx. No hemotympanum  Neck: +supple, no tenderness/meningismus,  Abd: soft, NT/ND  Mskel: no edema/erythema/cyanosis. FROM x4. Old ecchymosis to right shoulder noted  Skin: no rash, warm/dry. +lac to scalp  Neuro: AAOx3, no sensory/motor deficits. Normal finger nose, normal gait. CN 2-12 intact

## 2019-01-02 NOTE — ED PROVIDER NOTE - ATTENDING CONTRIBUTION TO CARE
77 yo F PMHx noted presents with family s/p trip and fall over walker while leaving her D appt.  no LOC, no n/v, unknown Tetanus.  On exam pt in NAD AAO x 3, GCS 15, + hematoma to scalp with small laceration, no step off, no raccoon no taylor, PERRL, no midline vertebral tenderness, Ext atraumatic, FRoM, hios non tender

## 2019-01-02 NOTE — ED PROVIDER NOTE - OBJECTIVE STATEMENT
hx from patient and son  77 yo F here for head injury after fall. Patient states she tripped and fell backwards with walker today after leaving doctor's office pta. +head injury. +bleeding from scalp.   No loc, HA, dizzy, n/v or change in mental status. No neck, back, chest or  abdominal pains. No pain to extremities. Last Tetanus unknown.

## 2019-01-02 NOTE — ED PROVIDER NOTE - NSFOLLOWUPINSTRUCTIONS_ED_ALL_ED_FT
Staple removal in 10 days. Bacitracin twice a day. Keep clean and dry for 24 hours.    Closed Head Injury    Closed head injury in an injury to your head that may or may not involve a traumatic brain injury (TBI). Symptoms of TBI can be short or long lasting and include headache, dizziness, interference with memory or speech, fatigue, confusion, changes in sleep, mood changes, nausea, depression/anxiety, and dulling of senses. Make sure to obtain proper rest which includes getting plenty of sleep, avoiding excessive visual stimulation, and avoiding activities that may cause physical or mental stress. Avoid any situation where there is potential for another head injury including sports.    SEEK MEDICAL CARE IF YOU HAVE THE FOLLOWING SYMPTOMS: unusual drowsiness, vomiting, severe dizziness, seizures, lightheadedness, muscular weakness, different pupil sizes, visual changes, or clear or bloody discharge from your ears or nose.    Laceration    A laceration is a cut that goes through all of the layers of the skin and into the tissue that is right under the skin. Some lacerations heal on their own. Others need to be closed with stitches (sutures), staples, skin adhesive strips, or skin glue. Proper laceration care minimizes the risk of infection and helps the laceration to heal better.     SEEK IMMEDIATE MEDICAL CARE IF YOU HAVE THE FOLLOWING SYMPTOMS: swelling around the wound, worsening pain, drainage from the wound, red streaking going away from your wound, inability to move finger or toe near the laceration, or discoloration of skin near the laceration.       Patient to be discharged from ED. Any available test results were discussed with patient and/or family. Verbal instructions given, including instructions to return to ED immediately for any new, worsening, or concerning symptoms. Patient endorsed understanding. Written discharge instructions additionally given, including follow-up plan.

## 2019-01-02 NOTE — ED ADULT NURSE NOTE - NSIMPLEMENTINTERV_GEN_ALL_ED
Implemented All Fall with Harm Risk Interventions:  [place] to call system. Call bell, personal items and telephone within reach. Instruct patient to call for assistance. Room bathroom lighting operational. Non-slip footwear when patient is off stretcher. Physically safe environment: no spills, clutter or unnecessary equipment. Stretcher in lowest position, wheels locked, appropriate side rails in place. Provide visual cue, wrist band, yellow gown, etc. Monitor gait and stability. Monitor for mental status changes and reorient to person, place, and time. Review medications for side effects contributing to fall risk. Reinforce activity limits and safety measures with patient and family. Provide visual clues: red socks.

## 2019-01-02 NOTE — ED PROVIDER NOTE - MEDICAL DECISION MAKING DETAILS
result katy and d/w pt and family,  wound care discussed, head injury instructions provided,  Rec ice to area, return for staple removal or for any other concerns, follow up with PMD

## 2019-01-02 NOTE — ED ADULT TRIAGE NOTE - CHIEF COMPLAINT QUOTE
trip and fall on curb with her walker, laceration and swelling to right backside of head. denies loc.

## 2020-04-01 ENCOUNTER — OUTPATIENT (OUTPATIENT)
Dept: OUTPATIENT SERVICES | Facility: HOSPITAL | Age: 80
LOS: 1 days | End: 2020-04-01
Payer: MEDICARE

## 2020-04-01 DIAGNOSIS — Z98.890 OTHER SPECIFIED POSTPROCEDURAL STATES: Chronic | ICD-10-CM

## 2020-04-01 PROCEDURE — G9001: CPT

## 2020-04-14 ENCOUNTER — EMERGENCY (EMERGENCY)
Facility: HOSPITAL | Age: 80
LOS: 0 days | Discharge: HOME | End: 2020-04-15
Attending: EMERGENCY MEDICINE | Admitting: EMERGENCY MEDICINE
Payer: MEDICARE

## 2020-04-14 VITALS
RESPIRATION RATE: 20 BRPM | DIASTOLIC BLOOD PRESSURE: 72 MMHG | OXYGEN SATURATION: 93 % | SYSTOLIC BLOOD PRESSURE: 130 MMHG | HEART RATE: 58 BPM | TEMPERATURE: 97 F
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
## 2020-04-14 DIAGNOSIS — I10 ESSENTIAL (PRIMARY) HYPERTENSION: ICD-10-CM
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
## 2020-04-14 DIAGNOSIS — E78.00 PURE HYPERCHOLESTEROLEMIA, UNSPECIFIED: ICD-10-CM

## 2020-04-14 DIAGNOSIS — E78.5 HYPERLIPIDEMIA, UNSPECIFIED: ICD-10-CM
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
## 2020-04-14 DIAGNOSIS — S09.90XA UNSPECIFIED INJURY OF HEAD, INITIAL ENCOUNTER: ICD-10-CM

## 2020-04-14 DIAGNOSIS — S00.83XA CONTUSION OF OTHER PART OF HEAD, INITIAL ENCOUNTER: ICD-10-CM

## 2020-04-14 DIAGNOSIS — Z98.890 OTHER SPECIFIED POSTPROCEDURAL STATES: Chronic | ICD-10-CM

## 2020-04-14 DIAGNOSIS — E11.9 TYPE 2 DIABETES MELLITUS WITHOUT COMPLICATIONS: ICD-10-CM
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
## 2020-04-14 DIAGNOSIS — Z79.899 OTHER LONG TERM (CURRENT) DRUG THERAPY: ICD-10-CM

## 2020-04-14 LAB
ALBUMIN SERPL ELPH-MCNC: 4.2 G/DL — SIGNIFICANT CHANGE UP (ref 3.5–5.2)
ALP SERPL-CCNC: 52 U/L — SIGNIFICANT CHANGE UP (ref 30–115)
ALT FLD-CCNC: 13 U/L — SIGNIFICANT CHANGE UP (ref 0–41)
ANION GAP SERPL CALC-SCNC: 16 MMOL/L — HIGH (ref 7–14)
APTT BLD: 31.3 SEC — SIGNIFICANT CHANGE UP (ref 27–39.2)
AST SERPL-CCNC: 41 U/L — SIGNIFICANT CHANGE UP (ref 0–41)
BASE EXCESS BLDV CALC-SCNC: -3.2 MMOL/L — LOW (ref -2–2)
BASOPHILS # BLD AUTO: 0.04 K/UL — SIGNIFICANT CHANGE UP (ref 0–0.2)
BASOPHILS NFR BLD AUTO: 0.5 % — SIGNIFICANT CHANGE UP (ref 0–1)
BILIRUB SERPL-MCNC: 0.2 MG/DL — SIGNIFICANT CHANGE UP (ref 0.2–1.2)
BUN SERPL-MCNC: 76 MG/DL — CRITICAL HIGH (ref 10–20)
CA-I SERPL-SCNC: 1.18 MMOL/L — SIGNIFICANT CHANGE UP (ref 1.12–1.3)
CALCIUM SERPL-MCNC: 8.9 MG/DL — SIGNIFICANT CHANGE UP (ref 8.5–10.1)
CHLORIDE SERPL-SCNC: 100 MMOL/L — SIGNIFICANT CHANGE UP (ref 98–110)
CO2 SERPL-SCNC: 22 MMOL/L — SIGNIFICANT CHANGE UP (ref 17–32)
CREAT SERPL-MCNC: 1.9 MG/DL — HIGH (ref 0.7–1.5)
EOSINOPHIL # BLD AUTO: 0.21 K/UL — SIGNIFICANT CHANGE UP (ref 0–0.7)
EOSINOPHIL NFR BLD AUTO: 2.4 % — SIGNIFICANT CHANGE UP (ref 0–8)
ETHANOL SERPL-MCNC: <10 MG/DL — SIGNIFICANT CHANGE UP
GAS PNL BLDV: 141 MMOL/L — SIGNIFICANT CHANGE UP (ref 136–145)
GAS PNL BLDV: SIGNIFICANT CHANGE UP
GLUCOSE SERPL-MCNC: 119 MG/DL — HIGH (ref 70–99)
HCO3 BLDV-SCNC: 21 MMOL/L — LOW (ref 22–29)
HCT VFR BLD CALC: 35.8 % — LOW (ref 37–47)
HCT VFR BLDA CALC: 33.9 % — LOW (ref 34–44)
HGB BLD CALC-MCNC: 11.1 G/DL — LOW (ref 14–18)
HGB BLD-MCNC: 11.6 G/DL — LOW (ref 12–16)
HOROWITZ INDEX BLDV+IHG-RTO: 21 — SIGNIFICANT CHANGE UP
IMM GRANULOCYTES NFR BLD AUTO: 0.6 % — HIGH (ref 0.1–0.3)
INR BLD: 1.03 RATIO — SIGNIFICANT CHANGE UP (ref 0.65–1.3)
LACTATE BLDV-MCNC: 0.9 MMOL/L — SIGNIFICANT CHANGE UP (ref 0.5–1.6)
LACTATE SERPL-SCNC: 2.7 MMOL/L — HIGH (ref 0.7–2)
LIDOCAIN IGE QN: 90 U/L — HIGH (ref 7–60)
LYMPHOCYTES # BLD AUTO: 1.41 K/UL — SIGNIFICANT CHANGE UP (ref 1.2–3.4)
LYMPHOCYTES # BLD AUTO: 16.3 % — LOW (ref 20.5–51.1)
MCHC RBC-ENTMCNC: 29.4 PG — SIGNIFICANT CHANGE UP (ref 27–31)
MCHC RBC-ENTMCNC: 32.4 G/DL — SIGNIFICANT CHANGE UP (ref 32–37)
MCV RBC AUTO: 90.6 FL — SIGNIFICANT CHANGE UP (ref 81–99)
MONOCYTES # BLD AUTO: 0.88 K/UL — HIGH (ref 0.1–0.6)
MONOCYTES NFR BLD AUTO: 10.2 % — HIGH (ref 1.7–9.3)
NEUTROPHILS # BLD AUTO: 6.05 K/UL — SIGNIFICANT CHANGE UP (ref 1.4–6.5)
NEUTROPHILS NFR BLD AUTO: 70 % — SIGNIFICANT CHANGE UP (ref 42.2–75.2)
NRBC # BLD: 0 /100 WBCS — SIGNIFICANT CHANGE UP (ref 0–0)
PCO2 BLDV: 35 MMHG — LOW (ref 41–51)
PH BLDV: 7.39 — SIGNIFICANT CHANGE UP (ref 7.26–7.43)
PLATELET # BLD AUTO: 353 K/UL — SIGNIFICANT CHANGE UP (ref 130–400)
PO2 BLDV: 70 MMHG — HIGH (ref 20–40)
POTASSIUM BLDV-SCNC: 3 MMOL/L — LOW (ref 3.3–5.6)
POTASSIUM SERPL-MCNC: 6.6 MMOL/L — CRITICAL HIGH (ref 3.5–5)
POTASSIUM SERPL-SCNC: 6.6 MMOL/L — CRITICAL HIGH (ref 3.5–5)
PROT SERPL-MCNC: 7.4 G/DL — SIGNIFICANT CHANGE UP (ref 6–8)
PROTHROM AB SERPL-ACNC: 11.9 SEC — SIGNIFICANT CHANGE UP (ref 9.95–12.87)
RBC # BLD: 3.95 M/UL — LOW (ref 4.2–5.4)
RBC # FLD: 14.3 % — SIGNIFICANT CHANGE UP (ref 11.5–14.5)
SAO2 % BLDV: 95 % — SIGNIFICANT CHANGE UP
SODIUM SERPL-SCNC: 138 MMOL/L — SIGNIFICANT CHANGE UP (ref 135–146)
TROPONIN T SERPL-MCNC: 0.01 NG/ML — SIGNIFICANT CHANGE UP
WBC # BLD: 8.64 K/UL — SIGNIFICANT CHANGE UP (ref 4.8–10.8)
WBC # FLD AUTO: 8.64 K/UL — SIGNIFICANT CHANGE UP (ref 4.8–10.8)

## 2020-04-14 PROCEDURE — 70450 CT HEAD/BRAIN W/O DYE: CPT | Mod: 26

## 2020-04-14 PROCEDURE — 72170 X-RAY EXAM OF PELVIS: CPT | Mod: 26

## 2020-04-14 PROCEDURE — 74176 CT ABD & PELVIS W/O CONTRAST: CPT | Mod: 26

## 2020-04-14 PROCEDURE — 72125 CT NECK SPINE W/O DYE: CPT | Mod: 26

## 2020-04-14 PROCEDURE — 70486 CT MAXILLOFACIAL W/O DYE: CPT | Mod: 26

## 2020-04-14 PROCEDURE — 71250 CT THORAX DX C-: CPT | Mod: 26

## 2020-04-14 PROCEDURE — 99285 EMERGENCY DEPT VISIT HI MDM: CPT

## 2020-04-14 NOTE — ED PROVIDER NOTE - ATTENDING CONTRIBUTION TO CARE
I personally evaluated the patient. I reviewed the Resident’s or Physician Assistant’s note (as assigned above), and agree with the findings and plan except as documented in my note.    80 female here for evaluation of mechanical fall yesterday. + head injury. No LOC. Limited historian. Family provides HPI and ROS as she is mostly bed ridden, uses family for ADLs.     ROS unable to obtain    PE: female in no distress. CV: pulses intact. CHEST: normal work of breathing. ABD: nondistended. SKIN: normal. EXT: FROM. no deformities. NECK: no midline deformities NEURO: no focal deficits. limited exam. HEENT: mucosa normal right sided hematoma noted with ecchmoses. EOM no hyphema.    Impression: fall, closed head injury     Plan: IV labs imaging supportive care and reevaluation

## 2020-04-14 NOTE — ED PROVIDER NOTE - PATIENT PORTAL LINK FT
You can access the FollowMyHealth Patient Portal offered by [facility] by registering at the following website: [email or web address] By joining [employer]’s FollowMyHealth portal, you will also be able to view your health information using other applications (apps) compatible with our system.

## 2020-04-14 NOTE — ED ADULT NURSE NOTE - NSIMPLEMENTINTERV_GEN_ALL_ED
Implemented All Fall with Harm Risk Interventions:  [place] to call system. Call bell, personal items and telephone within reach. Instruct patient to call for assistance. Room bathroom lighting operational. Non-slip footwear when patient is off stretcher. Physically safe environment: no spills, clutter or unnecessary equipment. Stretcher in lowest position, wheels locked, appropriate side rails in place. Provide visual cue, wrist band, yellow gown, etc. Monitor gait and stability. Monitor for mental status changes and reorient to person, place, and time. Review medications for side effects contributing to fall risk. Reinforce activity limits and safety measures with patient and family. Provide visual clues: red socks.

## 2020-04-14 NOTE — ED ADULT NURSE NOTE - NS ED NURSE RECORD ANOTHER VITAL SIGN
HPI





- HPI


Patient complains to provider of: Abscess left abdomen


Onset: Last week


Onset/Duration: Persistent


Pain Level: 3


Context: 


45-year-ol obese female complaining of history of MRSA and has persistentd 

abscess to her left abdomen that Bactrim did not resolve.  The erythema is 

decreased but she still feels a large nodule , she Thinks there is still pus in 

there.


Associated Symptoms: None


Exacerbated by: Denies


Relieved by: Denies


Similar symptoms previously: Yes





- ROS


ROS below otherwise negative: Yes


Systems Reviewed and Negative: Yes All other systems reviewed and negative





- CARDIOVASCULAR


Cardiovascular: DENIES: Chest pain





- REPRODUCTIVE


LMP: May 2017


Reproductive: DENIES: Pregnant:





Past Medical History





- General


Information source: Patient





- Social History


Smoking Status: Current Every Day Smoker


Chew tobacco use (# tins/day): No


Frequency of alcohol use: Social


Drug Abuse: None


Lives with: Family


Family History: Reviewed & Not Pertinent





- Past Medical History


Cardiac Medical History: Reports: Hx Hypercholesterolemia, Hx Hypertension


   Denies: Hx Pulmonary Embolism


Pulmonary Medical History: Reports: Hx Bronchitis, Hx COPD - no home o2, Hx 

Sleep Apnea


Endocrine Medical History: Reports: Hx Diabetes Mellitus Type 2, Hx 

Hypothyroidism


Renal/ Medical History: Denies: Hx Peritoneal Dialysis


Musculoskeltal Medical History: Reports Hx Arthritis - chronic neck, lower back

, and knee pain, Reports Hx Musculoskeletal Trauma


Skin Medical History: Reports Hx MRSA, Reports Hx Psoriasis


Psychiatric Medical History: Reports: Hx Anxiety, Hx Attention Deficit 

Hyperactivity Disorder, Hx Bipolar Disorder, Hx Depression


Infectious Medical History: Reports: Hx MRSA


Past Surgical History: Reports: Hx Abdominal Surgery - Laparoscopy, Hx  

Section - x3, Hx Cholecystectomy, Hx Gynecologic Surgery - Ectopic pregnancy 

repair, Hx Oral Surgery, Hx Orthopedic Surgery - Carpal tunnel bilat, Hx Tubal 

Ligation





- Immunizations


Immunizations up to date: Yes


Hx Diphtheria, Pertussis, Tetanus Vaccination: Yes





Vertical Provider Document





- CONSTITUTIONAL


Agree With Documented VS: Yes


Exam Limitations: No Limitations


General Appearance: No Apparent Distress





- INFECTION CONTROL


TRAVEL OUTSIDE OF THE [place] IN LAST 30 DAYS: No





- HEENT


HEENT: Normocephalic





- NECK


Neck: Supple





- GI/ABDOMEN


Gastrointestinal: Abdomen Soft, Abdomen Non-Tender


Notes: 


red nodule with flucuant center with adjacent blackhead.





Course





- Re-evaluation


Re-evalutation: 





17 07:33


pt states that the erythema has decreased since the septra but still had the 

knot and was worried that it still had pus.





Procedures





- Incision and Drainage


  ** Abdomen


Time completed: 07:30


Type: Simple


Anesthetic type: 1% Lidocaine


I&D procedure: Betadine prep applied, Sterile dressing applied


Incision Method: Incision made by scalpel - no pus, no suspect inflammation, 

from folliculitis


Amount/type of drainage: no pus





Discharge





- Discharge


Clinical Impression: 


 I and D abdominal abscess





Condition: Good


Disposition: HOME, SELF-CARE


Instructions:  Abscess ([facility]), Post Incision and Drainage, Clindamycin ([facility])


Additional Instructions: 


warm compress


dry dressing


clindamycin


to er if worse


Prescriptions: 


Clindamycin HCl [Cleocin 150 mg Capsule] 300 mg PO TID #42 capsule Yes

## 2020-04-14 NOTE — ED PROVIDER NOTE - CLINICAL SUMMARY MEDICAL DECISION MAKING FREE TEXT BOX
80 female here for fall with closed head injury. Had screening labs imaging supportive care medications and reevaluation, symptoms improved, plan discussed with patient and family, no acute findings, shared medical decision making performed with family via phone,  will discharge with outpatient management and return and follow up instructions.

## 2020-04-14 NOTE — ED PROVIDER NOTE - CARE PROVIDER_API CALL
Dax Ruiz (DO)  Infectious Disease; Internal Medicine  [address] [place] [address]  Phone: [phone]  Fax: [phone]  Established Patient  Follow Up Time: 4-6 Days

## 2020-04-14 NOTE — ED PROVIDER NOTE - PHYSICAL EXAMINATION
CONSTITUTIONAL: WA / WN / NAD  HEAD: + forehead ecchymosis  EYES: PERRL; EOMI; + right eye perioral ecchymosis no pain with extraocular movements   ENT: Normal pharynx; mucous membranes pink/moist, no erythema. No septal hematoma  NECK: Supple; no meningeal signs  CARD: RRR; nl S1/S2; no M/R/G.   RESP: Respiratory rate and effort are normal; breath sounds clear and equal bilaterally.  ABD: Soft, NT ND   MSK/EXT: No gross deformities; full range of motion. NO C spine ttp + ttp thoracic spine  SKIN: Warm and dry;   NEURO: AAOx3  PSYCH:  Normal Affect

## 2020-04-14 NOTE — ED ADULT TRIAGE NOTE - CHIEF COMPLAINT QUOTE
BIBA from home as per EMS pt fell while petting a dog yesterday. Today is having right eye pain. Denies LOC, not on a/c or syncope. pt FS was 122 in the field

## 2020-04-14 NOTE — ED PROVIDER NOTE - OBJECTIVE STATEMENT
80 year old female with a pmh of dm htn hld presents here s/p mechanical fall yesterday. patient was playing with her dog and hit her head on a vipul.No a/c No loc. Patient denies any recent fever chills cough cp sob n/v abdominal pain or urinary complaints.

## 2020-04-17 DIAGNOSIS — Z71.89 OTHER SPECIFIED COUNSELING: ICD-10-CM

## 2020-06-02 NOTE — ED PROVIDER NOTE - CPE EDP GASTRO NORM
Problem: Patient Care Overview  Goal: Plan of Care Review  Outcome: Ongoing (interventions implemented as appropriate)  Flowsheets (Taken 6/2/2020 0901)  Outcome Summary: Pt continues to have difficulty following therapists commands, though improves with tactile cues. She requires mod-max assist for transfer to chair.  Appears to be near baseline level of function and from home with 24 hour assist from family.  Recommend HH therapy with return to family at discharge. PPE: mask, shield, gloves.        normal...

## 2021-06-30 NOTE — ED ADULT NURSE NOTE - NSFALLRSKHRMRISKTYPE_ED_ALL_ED
bones(Osteoporosis,prev fx,steroid use,metastatic bone ca) The patient is a 42y Female complaining of

## 2021-12-15 NOTE — ED ADULT NURSE NOTE - NSSISCREENINGQ3_ED_A_ED
Patient Education    [employer] HANDOUT- PARENT  4 YEAR VISIT  Here are some suggestions from [employer] experts that may be of value to your family.     HOW YOUR FAMILY IS DOING  Stay involved in your community. Join activities when you can.  If you are worried about your living or food situation, talk with us. Community agencies and programs such as WIC and SNAP can also provide information and assistance.  Don t smoke or use e-cigarettes. Keep your home and car smoke-free. Tobacco-free spaces keep children healthy.  Don t use alcohol or drugs.  If you feel unsafe in your home or have been hurt by someone, let us know. Hotlines and community agencies can also provide confidential help.  Teach your child about how to be safe in the community.  Use correct terms for all body parts as your child becomes interested in how boys and girls differ.  No adult should ask a child to keep secrets from parents.  No adult should ask to see a child s private parts.  No adult should ask a child for help with the adult s own private parts.    GETTING READY FOR SCHOOL  Give your child plenty of time to finish sentences.  Read books together each day and ask your child questions about the stories.  Take your child to the library and let him choose books.  Listen to and treat your child with respect. Insist that others do so as well.  Model saying you re sorry and help your child to do so if he hurts someone s feelings.  Praise your child for being kind to others.  Help your child express his feelings.  Give your child the chance to play with others often.  Visit your child s  or  program. Get involved.  Ask your child to tell you about his day, friends, and activities.    HEALTHY HABITS  Give your child 16 to 24 oz of milk every day.  Limit juice. It is not necessary. If you choose to serve juice, give no more than 4 oz a day of 100%juice and always serve it with a meal.  Let your child have cool water  when she is thirsty.  Offer a variety of healthy foods and snacks, especially vegetables, fruits, and lean protein.  Let your child decide how much to eat.  Have relaxed family meals without TV.  Create a calm bedtime routine.  Have your child brush her teeth twice each day. Use a pea-sized amount of toothpaste with fluoride.    TV AND MEDIA  Be active together as a family often.  Limit TV, tablet, or smartphone use to no more than 1 hour of high-quality programs each day.  Discuss the programs you watch together as a family.  Consider making a family media plan.It helps you make rules for media use and balance screen time with other activities, including exercise.  Don t put a TV, computer, tablet, or smartphone in your child s bedroom.  Create opportunities for daily play.  Praise your child for being active.    SAFETY  Use a forward-facing car safety seat or switch to a belt-positioning booster seat when your child reaches the weight or height limit for her car safety seat, her shoulders are above the top harness slots, or her ears come to the top of the car safety seat.  The back seat is the safest place for children to ride until they are 13 years old.  Make sure your child learns to swim and always wears a life jacket. Be sure swimming pools are fenced.  When you go out, put a hat on your child, have her wear sun protection clothing, and apply sunscreen with SPF of 15 or higher on her exposed skin. Limit time outside when the sun is strongest (11:00 am-3:00 pm).  If it is necessary to keep a gun in your home, store it unloaded and locked with the ammunition locked separately.  Ask if there are guns in homes where your child plays. If so, make sure they are stored safely.  Ask if there are guns in homes where your child plays. If so, make sure they are stored safely.    WHAT TO EXPECT AT YOUR CHILD S 5 AND 6 YEAR VISIT  We will talk about  Taking care of your child, your family, and yourself  Creating family  routines and dealing with anger and feelings  Preparing for school  Keeping your child s teeth healthy, eating healthy foods, and staying active  Keeping your child safe at home, outside, and in the car        Helpful Resources: National Domestic Violence Hotline: 521.573.8825  Family Media Use Plan: www.healthychildren.org/MediaUsePlan  Smoking Quit Line: 973.169.6204   Information About Car Safety Seats: www.safercar.gov/parents  Toll-free Auto Safety Hotline: 179.215.6015  Consistent with Bright Futures: Guidelines for Health Supervision of Infants, Children, and Adolescents, 4th Edition  For more information, go to https://brightfutures.aap.org.         ADVICE FOR PARENTS   Your Child s Developing Smile       1. When will your child s teeth start to come in?     Usually baby teeth (primary teeth) begin to appear when the baby is between 6-12 months of age.     Most children have a full set of 20 primary teeth by the time they are 2 1/2 to 3 years.    The picture shows when you can expect your child s teeth to come in.   2. Why is it important to take care of your child s teeth (primary and permanent)?    Your child s teeth do at least six important things:     Allow your child to chew food.     Help your child speak clearly.     Guide permanent teeth into place.     Aid in formation of jaw and face.     Add to your child s good health and self-esteem.     Make a beautiful smile!   3. When and how should you clean your child s mouth and teeth?     Wipe your child s gums daily even before the first tooth comes in.     Wipe your child s teeth with a clean, damp washcloth or gauze pad until you can effectively brush them (this will be at approximately 1 year of age).     The easiest way to do this is to sit down and place your child s head in your lap or lay your child on a dressing table or the floor in whatever position allows you to look easily into your child s mouth.     Teach your child to brush his/her  teeth by showing her/him how to hold the brush (aiming especially where the tooth meets the gum line) and by demonstrating how you brush your teeth. Brushing should be done twice a day (on arising, preferably before breakfast, and at bed time). You should brush your child s teeth until your child is 4-5 years old and should supervise your child s brushing until your child is 8-9 years old. Before your child is 9 - 10 years old, close supervision is needed to make certain that all the teeth are brushed well and that your child does not swallow the toothpaste, and to teach him/her how to spit out the toothpaste and to rinse with tap water. By 9-10 years of age, children will usually have sufficient manual dexterity to clean their teeth thoroughly without supervision. Check with your child s medical provider to learn when you should start using fluoride toothpaste (a thin film (less than a pea-sized amount) only).   4. What can you do when your child begins teething?     When your child is teething, he/she may have sore gums, be restless and irritable, have difficulty sleeping or eating well, and have loose stools. Rub your child s gums with your thumb/finger or a cold washcloth or allow your child to chew on something cold, such as a chilled teething ring or a clean washcloth. To make your child more comfortable, give an appropriate dosage of the non-aspirin medication you use when your child has a fever. If your child has more serious symptoms, visit her/his doctor.     Teething does not cause fever, ear infections, or long-term diarrhea. Remember: your child is teething from 4 - 5 months of age until at least 2 years of age so you can blame everything or nothing on teething.   5. What is early childhood caries?     Tooth decay in infants and -aged children is called  early childhood caries.  Tooth decay can occur soon after the teeth begin to appear and is caused by frequent and prolonged exposures of the  teeth to liquids that contain sugar (e.g., breast milk, formula, sugar water, fruit juice, and other sweetened liquids) and, in the child with chronic illness, to sugar-containing liquid medications which are regularly taken for a long time.   6. What is dental plaque?     Plaque is a sticky film on the teeth that contains, among other things, bacteria (germs). It forms daily in the mouth and is hard to see because it is transparent. However, when enough has accumulated, it is visible as a yellowish-brown stain which becomes hard to remove by regular brushing.     Bacteria which live in plaque may be passed from primary caregiver (usually mother) to child through saliva. If you have had problems with your teeth (multiple caries), take special care not to transmit your saliva to your baby s mouth. Hence, do NOT wet the pacifier with your saliva; do NOT prechew or taste food and then put it in your child s mouth; do NOT kiss your child on the lips.     Plaque bacteria use sugar as their food. Even a very small amount of sugar is enough for plaque bacteria to produce acid. It is this acid that attacks the enamel of the tooth, causing the tooth to decay.     Frequent eating of sugar-containing foods or taking of sugar-containing liquid medications on a regular, chronic basis leads to frequent acid attacks on the teeth.   7. What is tooth decay?     If plaque is allowed to stay on the tooth instead of being removed, the acid formed by the bacteria within the plaque will cause the enamel to lose minerals (demineralization). The first visual evidence of demineralization is a  white spot  lesion, usually at the gum line. The white spot lesion can be reversed and the decay process stopped if minerals can be restored to the enamel (remineralization). This can happen if exposure to sugar-containing liquids becomes less frequent and/or if more fluoride is made available to the tooth.     If remineralization does not occur and  decay continues, it will progress to cavitation which can only be repaired surgically (drilling and filling).     Cavity formation can be stopped by changing diet, practicing good oral hygiene and using fluoride. Once a cavity is formed, it can only be corrected by a dentist with a filling.   Tooth decay is an infectious disease which is PREVENTABLE.   8. How can tooth decay be prevented?     At least twice a day, wipe your child s mouth with a clean gauze pad or wet cloth.     Once your child s teeth start to come in, clean them by using a wet cloth, finger cot or a small, soft brush and a thin film (less than a pea-sized amount) of fluoride toothpaste. If your child is under the age of 2, ask your child s medical provider or dentist whether fluoride tooth paste should be used.     Teach your child how to brush when he/she seems ready to learn. Supervise brushing to age 8-9 to make sure your child is doing a thorough job and is not swallowing the toothpaste. By age 9-10, most children have sufficient manual dexterity to do it themselves without supervision.     Replace your child s toothbrush when the bristles flare, bend, or become frayed. Such bristles on a toothbrush will not remove plaque effectively and may injure gums.     If the teeth are touching and have no gaps between them, then you should also floss between them.     Start teaching your child to drink out of a cup as soon as she/he has coordination of swallowing (about 10 months of age). The sooner your child is off the bottle, the less likely it is that your child will have cavities.     Don t give your child a bottle or  sippy  cup filled with a sweet liquid (e.g., juice, sweetened water, soda pop, milk) when putting him/her to sleep (nap or bedtime); instead, fill the bottle with plain tap water only. All other liquids should be used at meal-times only.     Never give your child a pacifier dipped in any sweet liquid, and don t put your child s  pacifier in your mouth before placing it into your child s mouth. If you want to moisten it, use tap water     Use fluoride to strengthen the tooth enamel against decay. Fluoride is one of the most effective elements for preventing tooth decay and is therefore extremely important. The most effective way for your child to get fluoride s protection is by drinking plain tap water containing the right amount of the mineral (about one part fluoride per million parts water). Over 98% of public water in [place] is fluoridated (> 0.7-1.2 ppm fluoride); however, most well water does not contain enough fluoride naturally to prevent tooth decay. If you wonder whether your water supply is adequately fluoridated (> 0.7-1.2 ppm fluoride), ask your city, [facility], or state Health Department. If your water does not have enough fluoride you should consult your child s physician or dentist about a fluoride supplement. You should also talk to your child s physician or dentist about fluoride varnish treatments. Avoid giving your child bottled water or water that has been filtered (e.g., with a reverse osmosis (RO) filter), as neither may contain enough fluoride to keep your child s teeth healthy.     Keep your child on a healthy diet to maintain good dental and physical health. A child should eat a balanced diet, free from too many sweets. Provide nutritious snacks that are low in sugar. Help your child develop good eating habits.     Help your child develop a positive attitude toward dental care. Your child s first visit to the dentist should be at around one year of age and then once every six months for checkups, or on whatever schedule your child s dentist recommends.   9. What can you do about your child s nutrition?     Choose healthy foods and maintain your child on a well-balanced diet to keep good dental and physical health.     Avoid giving your child foods high in sugar, such as soda pop, candies, sweetened cereals, fruit  roll-ups, and pastries between meals.     Offer your child snacks that are low in sugar such as raw fruits and vegetables, pretzels, cheese, yogurt, and unsweetened applesauce.     Do not give your child a bottle or  sippy  cup filled with a sweet liquid (e.g., juice, sweetened water, soda pop, milk) when putting him/her to sleep (nap or bedtime); instead, fill the bottle with plain tap water only. Best of all, don t give any bottle at nap or bedtime; children will go to sleep without a bottle.     Help your child develop good eating habits.   10. When should you take your child for his/her first dental visit?     It is recommended that children visit the dentist around their first birthday.    The primary purpose of this visit is so the dentist or hygienist (or the medical provider if a dentist is not available) can inform you about risk of cavities, provide you with information (e.g., how to prevent common problems including decay and trauma, what to expect of tooth and bite development), examine your child s teeth, gums, and the rest of the mouth for abnormalities, refer to a dentist as necessary to ensure that your child gets started in the right direction toward good oral health, and show you how to care for your child s teeth and recommend how much fluoride your child should use.     If you think there is a problem, see the dentist at once. DO NOT wait until your child is in pain!   11. Should your child use fluoride?     Fluoride is one of the most effective elements for preventing tooth decay and is therefore extremely important. The most effective way for your child to get fluoride s protection is by drinking water containing the right amount of the mineral (community water supplies that are fluoridated contain about one part fluoride per million parts water). Avoid giving your child bottled water or water that has been filtered (e.g., with a reverse osmosis (RO) filter); neither may contain enough fluoride  to be effective against tooth decay.     It is also beneficial for your child to brush with a fluoride toothpaste (if your child is under 2 years of age, ask your medical provider or dentist about using fluoride toothpaste). If your child is 4-5 years old, you should do the brushing for her/him and you should make sure that the toothpaste is not swallowed. Though your child may be able to brush on his/her own once 4-5 years of age, you should supervise until your child is 8-9 to make certain that the teeth are brushed well and the toothpaste is not swallowed. By age 9-10, your child should have sufficient manual dexterity to brush unsupervised. A thin film (less than a pea-sized amount) of toothpaste should be placed on the child s toothbrush and the child should be taught to spit out the remaining toothpaste.     There are also fluoride treatments available at school-based programs, at the dentist  office, and at the office of your child s medical provider. Ask your child s medical provider which method he/she recommends for your child.   12. What are dental sealants?     Dental sealants are thin plastic coatings which protect the pits and fissures of the chewing surfaces of the back teeth (molars). These teeth appear around age 6 and are where most tooth decay occurs. Not every child needs sealants, so ask your child s dentist if sealants are needed for your child.   13. When should your child get sealants?     If needed, sealants are applied when the first permanent molars (back teeth) erupt, usually around age 6-7 years.     Sometimes the dentist will apply sealants to the primary (baby) molars. Ask your dentist about this.   14. What is fluoride varnish?     Fluoride varnish is a liquid coating that is placed on the surfaces of teeth (just like nail polish on nails).     Fluoride varnish strengthens your child s teeth. Remember: the stronger the teeth are, the less chance that your child will get cavities.      Ask your child s dentist (or medical provider) whether your child should have a fluoride varnish treatment.   If fluoride varnish is applied to your child s teeth, the teeth will not look  as bright and shiny as usual after the treatment. They should look normal by the next day and the protective effect of the varnish will continue to work for several months. To achieve the best result:   Your child should eat only soft foods for the rest of the day.   Your child s teeth should not be brushed on the day the varnish is applied.   You may start brushing the next day in usual fashion.       Directions for Your Child's Care After Treatment    5% sodium fluoride varnish was applied to your child's teeth today. This treatment safely delivers fluoride and a protective coating to the tooth surfaces. To obtain the maximum benefit, please follow these recommendations:       Do not brush or floss for at least 4-6 hours     If possible, wait until tomorrow morning to resume brushing and flossing      Feed a soft food diet for the rest of the day      Avoid hot drinks and products containing alcohol (e.g., beverages, oral rinses, etc.) for the rest of the day     Your child will be able to feel the varnish on his/her teeth. Once brushing or flossing is resumed, the varnish will be removed from the tooth surface over the next several days.     Printed in [place]. [employer] 2007 All rights reserved. [id number] - Printed 1007 -4681-4   No

## 2025-03-18 NOTE — ED PROVIDER NOTE - PMH
The patient has a history of supraventricular tachycardia now status post ablation therapy in 2020.  The patient is currently complaining of symptoms of palpitation and racing sensation.  I will therefore arrange for the patient to have a ZIO for 2 weeks.  I will also arrange for the patient to have an echocardiogram.  An EKG done today shows ventricular bigeminy I would like to arrange for the patient to have a regular exercise stress test.  Orders:    POCT ECG     Diabetes    Hypercholesteremia    Hypertension, unspecified type

## 2025-06-15 NOTE — DISCHARGE NOTE ADULT - CARE PLAN
alberts placed with ~ 600cc removed. Patient feeling much better. ambulatory w/ steady gait. no need for increased O2. will d/c home w/ urology follow-up and return precautions. instructed not to attempt to remove alberts on own Principal Discharge DX:	Gastrointestinal hemorrhage with melena  Goal:	further recommendations by gastroenterology  Assessment and plan of treatment:	continue protonix  follow up with gastroenterology  Secondary Diagnosis:	Hypertension, unspecified type  Goal:	blood pressure control  Assessment and plan of treatment:	continue medications  follow up with primary care doctor